# Patient Record
Sex: FEMALE | Race: WHITE
[De-identification: names, ages, dates, MRNs, and addresses within clinical notes are randomized per-mention and may not be internally consistent; named-entity substitution may affect disease eponyms.]

---

## 2015-10-01 VITALS — SYSTOLIC BLOOD PRESSURE: 116 MMHG | DIASTOLIC BLOOD PRESSURE: 68 MMHG

## 2017-01-26 VITALS
SYSTOLIC BLOOD PRESSURE: 121 MMHG | DIASTOLIC BLOOD PRESSURE: 56 MMHG | DIASTOLIC BLOOD PRESSURE: 56 MMHG | SYSTOLIC BLOOD PRESSURE: 121 MMHG

## 2017-03-21 ENCOUNTER — HOSPITAL ENCOUNTER (OUTPATIENT)
Dept: HOSPITAL 63 - DXRAD | Age: 77
Discharge: HOME | End: 2017-03-21
Attending: SPECIALIST
Payer: MEDICARE

## 2017-03-21 DIAGNOSIS — Z00.00: Primary | ICD-10-CM

## 2017-03-21 DIAGNOSIS — Z00.01: ICD-10-CM

## 2017-03-21 DIAGNOSIS — M85.88: ICD-10-CM

## 2017-03-21 DIAGNOSIS — Z13.820: ICD-10-CM

## 2017-03-21 PROCEDURE — 77080 DXA BONE DENSITY AXIAL: CPT

## 2017-03-21 NOTE — RAD
Indication screen for osteopenia.



The lumbar spine and right hip were evaluated. Note is made of a previous

examination 2/12/2008.



In the lumbar spine the average bone mineral density is approximately 1.5

g/cc. T score of 2.4 is normal. In the right hip the average bone mineral

density is approximately 0.98 g/cc with a T score of -0.3, also normal.



IMPRESSION: Normal bone mineral density in the lumbar spine and right hip

## 2017-06-02 ENCOUNTER — HOSPITAL ENCOUNTER (OUTPATIENT)
Dept: HOSPITAL 63 - RAD | Age: 77
Discharge: HOME | End: 2017-06-02
Attending: NURSE PRACTITIONER
Payer: MEDICARE

## 2017-06-02 DIAGNOSIS — R51: Primary | ICD-10-CM

## 2017-06-02 PROCEDURE — 70450 CT HEAD/BRAIN W/O DYE: CPT

## 2017-06-02 NOTE — RAD
Indication headache for 2 weeks.



Noncontrast images of the head were obtained. Note is made of a previous

examination 4/18/2014.



The calvarium appears unremarkable. There is a small polyp or retention cyst

in the left maxillary sinus. There is no subdural or epidural hematoma. The

ventricles and sulci are normal given the patient's age. There is no mass or

midline shift. No hemorrhage is seen. Acute finding is not apparent.



IMPRESSION: No acute finding seen in the head.















PQRS Compliance Statement:



One or more of the following individualized dose reduction techniques were

utilized for this examination:

1. Automated exposure control

2. Adjustment of the mA and/or kV according to patient size

3. Use of iterative reconstruction technique

## 2017-06-05 ENCOUNTER — HOSPITAL ENCOUNTER (INPATIENT)
Dept: HOSPITAL 63 - 1 SOUTH | Age: 77
LOS: 2 days | Discharge: HOME | DRG: 811 | End: 2017-06-07
Attending: INTERNAL MEDICINE | Admitting: INTERNAL MEDICINE
Payer: MEDICARE

## 2017-06-05 VITALS — SYSTOLIC BLOOD PRESSURE: 151 MMHG | DIASTOLIC BLOOD PRESSURE: 78 MMHG

## 2017-06-05 VITALS — SYSTOLIC BLOOD PRESSURE: 185 MMHG | DIASTOLIC BLOOD PRESSURE: 71 MMHG

## 2017-06-05 VITALS — BODY MASS INDEX: 30.48 KG/M2 | HEIGHT: 64 IN | WEIGHT: 178.56 LBS

## 2017-06-05 VITALS — DIASTOLIC BLOOD PRESSURE: 80 MMHG | SYSTOLIC BLOOD PRESSURE: 163 MMHG

## 2017-06-05 VITALS — SYSTOLIC BLOOD PRESSURE: 154 MMHG | DIASTOLIC BLOOD PRESSURE: 78 MMHG

## 2017-06-05 VITALS — SYSTOLIC BLOOD PRESSURE: 175 MMHG | DIASTOLIC BLOOD PRESSURE: 85 MMHG

## 2017-06-05 VITALS — SYSTOLIC BLOOD PRESSURE: 143 MMHG | DIASTOLIC BLOOD PRESSURE: 54 MMHG

## 2017-06-05 DIAGNOSIS — M19.90: ICD-10-CM

## 2017-06-05 DIAGNOSIS — Z82.3: ICD-10-CM

## 2017-06-05 DIAGNOSIS — I10: ICD-10-CM

## 2017-06-05 DIAGNOSIS — Z80.0: ICD-10-CM

## 2017-06-05 DIAGNOSIS — Z86.010: ICD-10-CM

## 2017-06-05 DIAGNOSIS — Z82.5: ICD-10-CM

## 2017-06-05 DIAGNOSIS — Z90.722: ICD-10-CM

## 2017-06-05 DIAGNOSIS — I48.91: ICD-10-CM

## 2017-06-05 DIAGNOSIS — N17.0: ICD-10-CM

## 2017-06-05 DIAGNOSIS — D50.9: Primary | ICD-10-CM

## 2017-06-05 DIAGNOSIS — Z85.528: ICD-10-CM

## 2017-06-05 DIAGNOSIS — Z91.040: ICD-10-CM

## 2017-06-05 DIAGNOSIS — Z93.6: ICD-10-CM

## 2017-06-05 DIAGNOSIS — Z88.1: ICD-10-CM

## 2017-06-05 DIAGNOSIS — Z88.0: ICD-10-CM

## 2017-06-05 DIAGNOSIS — J44.9: ICD-10-CM

## 2017-06-05 DIAGNOSIS — Z80.8: ICD-10-CM

## 2017-06-05 DIAGNOSIS — Z88.6: ICD-10-CM

## 2017-06-05 DIAGNOSIS — Z90.710: ICD-10-CM

## 2017-06-05 DIAGNOSIS — Z88.8: ICD-10-CM

## 2017-06-05 DIAGNOSIS — Z80.1: ICD-10-CM

## 2017-06-05 DIAGNOSIS — Z98.42: ICD-10-CM

## 2017-06-05 DIAGNOSIS — Z87.891: ICD-10-CM

## 2017-06-05 DIAGNOSIS — Z98.41: ICD-10-CM

## 2017-06-05 DIAGNOSIS — Z90.5: ICD-10-CM

## 2017-06-05 DIAGNOSIS — Z79.01: ICD-10-CM

## 2017-06-05 DIAGNOSIS — Z83.3: ICD-10-CM

## 2017-06-05 DIAGNOSIS — Z91.09: ICD-10-CM

## 2017-06-05 LAB
ALBUMIN SERPL-MCNC: 3.2 G/DL (ref 3.4–5)
ALBUMIN/GLOB SERPL: 0.8 {RATIO} (ref 1–1.7)
ALP SERPL-CCNC: 81 U/L (ref 46–116)
ALT SERPL-CCNC: 21 U/L (ref 14–59)
ANION GAP SERPL CALC-SCNC: 13 MMOL/L (ref 6–14)
ANISOCYTOSIS BLD QL SMEAR: SLIGHT
APTT PPP: YELLOW S
AST SERPL-CCNC: 18 U/L (ref 15–37)
BACTERIA #/AREA URNS HPF: 0 /HPF
BASOPHILS # BLD AUTO: 0.1 X10^3/UL (ref 0–0.2)
BASOPHILS NFR BLD: 1 % (ref 0–3)
BILIRUB SERPL-MCNC: 0.3 MG/DL (ref 0.2–1)
BILIRUB UR QL STRIP: (no result)
BUN/CREAT SERPL: 21 (ref 6–20)
CA-I SERPL ISE-MCNC: 27 MG/DL (ref 7–20)
CALCIUM SERPL-MCNC: 8.5 MG/DL (ref 8.5–10.1)
CHLORIDE SERPL-SCNC: 103 MMOL/L (ref 98–107)
CO2 SERPL-SCNC: 22 MMOL/L (ref 21–32)
CREAT SERPL-MCNC: 1.3 MG/DL (ref 0.6–1)
DACRYOCYTES BLD QL SMEAR: (no result)
EOSINOPHIL NFR BLD: 0.1 X10^3/UL (ref 0–0.7)
EOSINOPHIL NFR BLD: 2 % (ref 0–3)
ERYTHROCYTE [DISTWIDTH] IN BLOOD BY AUTOMATED COUNT: 19.8 % (ref 11.5–14.5)
FIBRINOGEN PPP-MCNC: CLEAR MG/DL
GFR SERPLBLD BASED ON 1.73 SQ M-ARVRAT: 40 ML/MIN
GLOBULIN SER-MCNC: 4.1 G/DL (ref 2.2–3.8)
GLUCOSE SERPL-MCNC: 92 MG/DL (ref 70–99)
GLUCOSE UR STRIP-MCNC: (no result) MG/DL
HCT VFR BLD CALC: 19.8 % (ref 36–47)
HGB BLD-MCNC: 5.8 G/DL (ref 12–15.5)
HYPOCHROMIA BLD QL SMEAR: (no result)
LDH SERPL L TO P-CCNC: 201 U/L (ref 81–234)
LYMPHOCYTES # BLD: 1.8 X10^3/UL (ref 1–4.8)
LYMPHOCYTES NFR BLD AUTO: 29 % (ref 24–48)
MCH RBC QN AUTO: 18 PG (ref 25–35)
MCHC RBC AUTO-ENTMCNC: 29 G/DL (ref 31–37)
MCV RBC AUTO: 60 FL (ref 79–100)
MICROCYTES BLD QL SMEAR: (no result)
MONO #: 0.6 X10^3/UL (ref 0–1.1)
MONOCYTES NFR BLD: 10 % (ref 0–9)
NEUT #: 3.6 X10^3UL (ref 1.8–7.7)
NEUTROPHILS NFR BLD AUTO: 58 % (ref 31–73)
NITRITE UR QL STRIP: (no result)
OVALOCYTES BLD QL SMEAR: (no result)
PLATELET # BLD AUTO: 314 X10^3/UL (ref 140–400)
PLATELET # BLD EST: ADEQUATE 10*3/UL
POLYCHROMASIA BLD QL SMEAR: PRESENT
POTASSIUM SERPL-SCNC: 3.8 MMOL/L (ref 3.5–5.1)
PROT SERPL-MCNC: 7.3 G/DL (ref 6.4–8.2)
RBC # BLD AUTO: 3.32 X10^6/UL (ref 3.5–5.4)
RBC #/AREA URNS HPF: (no result) /HPF (ref 0–2)
SODIUM SERPL-SCNC: 138 MMOL/L (ref 136–145)
SP GR UR STRIP: 1.01
SQUAMOUS #/AREA URNS LPF: (no result) /LPF
UROBILINOGEN UR-MCNC: 0.2 MG/DL
WBC # BLD AUTO: 6.2 X10^3/UL (ref 4–11)
WBC #/AREA URNS HPF: (no result) /HPF (ref 0–4)

## 2017-06-05 PROCEDURE — P9016 RBC LEUKOCYTES REDUCED: HCPCS

## 2017-06-05 PROCEDURE — 86920 COMPATIBILITY TEST SPIN: CPT

## 2017-06-05 PROCEDURE — 82607 VITAMIN B-12: CPT

## 2017-06-05 PROCEDURE — 86850 RBC ANTIBODY SCREEN: CPT

## 2017-06-05 PROCEDURE — 74176 CT ABD & PELVIS W/O CONTRAST: CPT

## 2017-06-05 PROCEDURE — 94760 N-INVAS EAR/PLS OXIMETRY 1: CPT

## 2017-06-05 PROCEDURE — 83540 ASSAY OF IRON: CPT

## 2017-06-05 PROCEDURE — 80048 BASIC METABOLIC PNL TOTAL CA: CPT

## 2017-06-05 PROCEDURE — 85008 BL SMEAR W/O DIFF WBC COUNT: CPT

## 2017-06-05 PROCEDURE — 85027 COMPLETE CBC AUTOMATED: CPT

## 2017-06-05 PROCEDURE — 83615 LACTATE (LD) (LDH) ENZYME: CPT

## 2017-06-05 PROCEDURE — 30233N1 TRANSFUSION OF NONAUTOLOGOUS RED BLOOD CELLS INTO PERIPHERAL VEIN, PERCUTANEOUS APPROACH: ICD-10-PCS | Performed by: INTERNAL MEDICINE

## 2017-06-05 PROCEDURE — 80053 COMPREHEN METABOLIC PANEL: CPT

## 2017-06-05 PROCEDURE — 36415 COLL VENOUS BLD VENIPUNCTURE: CPT

## 2017-06-05 PROCEDURE — 86901 BLOOD TYPING SEROLOGIC RH(D): CPT

## 2017-06-05 PROCEDURE — 82728 ASSAY OF FERRITIN: CPT

## 2017-06-05 PROCEDURE — 70450 CT HEAD/BRAIN W/O DYE: CPT

## 2017-06-05 PROCEDURE — 86900 BLOOD TYPING SEROLOGIC ABO: CPT

## 2017-06-05 PROCEDURE — 94640 AIRWAY INHALATION TREATMENT: CPT

## 2017-06-05 PROCEDURE — 83550 IRON BINDING TEST: CPT

## 2017-06-05 PROCEDURE — 81001 URINALYSIS AUTO W/SCOPE: CPT

## 2017-06-05 PROCEDURE — 82274 ASSAY TEST FOR BLOOD FECAL: CPT

## 2017-06-05 PROCEDURE — 82746 ASSAY OF FOLIC ACID SERUM: CPT

## 2017-06-05 RX ADMIN — IPRATROPIUM BROMIDE AND ALBUTEROL SULFATE SCH ML: .5; 3 SOLUTION RESPIRATORY (INHALATION) at 12:00

## 2017-06-05 RX ADMIN — APIXABAN SCH MG: 5 TABLET, FILM COATED ORAL at 21:55

## 2017-06-05 RX ADMIN — IPRATROPIUM BROMIDE AND ALBUTEROL SULFATE SCH ML: .5; 3 SOLUTION RESPIRATORY (INHALATION) at 22:22

## 2017-06-05 RX ADMIN — FLECAINIDE ACETATE SCH MG: 50 TABLET ORAL at 21:58

## 2017-06-05 RX ADMIN — IPRATROPIUM BROMIDE AND ALBUTEROL SULFATE SCH ML: .5; 3 SOLUTION RESPIRATORY (INHALATION) at 16:00

## 2017-06-05 RX ADMIN — IPRATROPIUM BROMIDE AND ALBUTEROL SULFATE SCH ML: .5; 3 SOLUTION RESPIRATORY (INHALATION) at 22:28

## 2017-06-06 VITALS — DIASTOLIC BLOOD PRESSURE: 82 MMHG | SYSTOLIC BLOOD PRESSURE: 132 MMHG

## 2017-06-06 VITALS — SYSTOLIC BLOOD PRESSURE: 133 MMHG | DIASTOLIC BLOOD PRESSURE: 92 MMHG

## 2017-06-06 VITALS — SYSTOLIC BLOOD PRESSURE: 149 MMHG | DIASTOLIC BLOOD PRESSURE: 81 MMHG

## 2017-06-06 VITALS — DIASTOLIC BLOOD PRESSURE: 68 MMHG | SYSTOLIC BLOOD PRESSURE: 138 MMHG

## 2017-06-06 VITALS — SYSTOLIC BLOOD PRESSURE: 157 MMHG | DIASTOLIC BLOOD PRESSURE: 76 MMHG

## 2017-06-06 VITALS — DIASTOLIC BLOOD PRESSURE: 80 MMHG | SYSTOLIC BLOOD PRESSURE: 145 MMHG

## 2017-06-06 VITALS — DIASTOLIC BLOOD PRESSURE: 72 MMHG | SYSTOLIC BLOOD PRESSURE: 129 MMHG

## 2017-06-06 VITALS — DIASTOLIC BLOOD PRESSURE: 77 MMHG | SYSTOLIC BLOOD PRESSURE: 158 MMHG

## 2017-06-06 VITALS — DIASTOLIC BLOOD PRESSURE: 69 MMHG | SYSTOLIC BLOOD PRESSURE: 144 MMHG

## 2017-06-06 VITALS — SYSTOLIC BLOOD PRESSURE: 132 MMHG | DIASTOLIC BLOOD PRESSURE: 82 MMHG

## 2017-06-06 VITALS — SYSTOLIC BLOOD PRESSURE: 135 MMHG | DIASTOLIC BLOOD PRESSURE: 65 MMHG

## 2017-06-06 VITALS — DIASTOLIC BLOOD PRESSURE: 75 MMHG | SYSTOLIC BLOOD PRESSURE: 155 MMHG

## 2017-06-06 LAB
ALBUMIN SERPL-MCNC: 2.9 G/DL (ref 3.4–5)
ALBUMIN/GLOB SERPL: 0.8 {RATIO} (ref 1–1.7)
ALP SERPL-CCNC: 71 U/L (ref 46–116)
ALT SERPL-CCNC: 19 U/L (ref 14–59)
ANION GAP SERPL CALC-SCNC: 11 MMOL/L (ref 6–14)
AST SERPL-CCNC: 18 U/L (ref 15–37)
BASOPHILS # BLD AUTO: 0.1 X10^3/UL (ref 0–0.2)
BASOPHILS NFR BLD: 1 % (ref 0–3)
BILIRUB SERPL-MCNC: 0.4 MG/DL (ref 0.2–1)
BUN/CREAT SERPL: 20 (ref 6–20)
CA-I SERPL ISE-MCNC: 20 MG/DL (ref 7–20)
CALCIUM SERPL-MCNC: 8.3 MG/DL (ref 8.5–10.1)
CHLORIDE SERPL-SCNC: 107 MMOL/L (ref 98–107)
CO2 SERPL-SCNC: 24 MMOL/L (ref 21–32)
CREAT SERPL-MCNC: 1 MG/DL (ref 0.6–1)
EOSINOPHIL NFR BLD: 0.1 X10^3/UL (ref 0–0.7)
EOSINOPHIL NFR BLD: 2 % (ref 0–3)
ERYTHROCYTE [DISTWIDTH] IN BLOOD BY AUTOMATED COUNT: 25.5 % (ref 11.5–14.5)
GFR SERPLBLD BASED ON 1.73 SQ M-ARVRAT: 54.2 ML/MIN
GLOBULIN SER-MCNC: 3.7 G/DL (ref 2.2–3.8)
GLUCOSE SERPL-MCNC: 96 MG/DL (ref 70–99)
HCT VFR BLD CALC: 24.8 % (ref 36–47)
HGB BLD-MCNC: 7.7 G/DL (ref 12–15.5)
LYMPHOCYTES # BLD: 1.7 X10^3/UL (ref 1–4.8)
LYMPHOCYTES NFR BLD AUTO: 29 % (ref 24–48)
MCH RBC QN AUTO: 20 PG (ref 25–35)
MCHC RBC AUTO-ENTMCNC: 31 G/DL (ref 31–37)
MCV RBC AUTO: 65 FL (ref 79–100)
MONO #: 0.7 X10^3/UL (ref 0–1.1)
MONOCYTES NFR BLD: 12 % (ref 0–9)
NEUT #: 3.3 X10^3UL (ref 1.8–7.7)
NEUTROPHILS NFR BLD AUTO: 56 % (ref 31–73)
PLATELET # BLD AUTO: 253 X10^3/UL (ref 140–400)
POTASSIUM SERPL-SCNC: 3.8 MMOL/L (ref 3.5–5.1)
PROT SERPL-MCNC: 6.6 G/DL (ref 6.4–8.2)
RBC # BLD AUTO: 3.8 X10^6/UL (ref 3.5–5.4)
SODIUM SERPL-SCNC: 142 MMOL/L (ref 136–145)
WBC # BLD AUTO: 5.8 X10^3/UL (ref 4–11)

## 2017-06-06 RX ADMIN — OYSTER SHELL CALCIUM WITH VITAMIN D SCH TAB: 500; 200 TABLET, FILM COATED ORAL at 17:21

## 2017-06-06 RX ADMIN — PANTOPRAZOLE SODIUM SCH MG: 40 TABLET, DELAYED RELEASE ORAL at 08:50

## 2017-06-06 RX ADMIN — FLUTICASONE PROPIONATE SCH SPRAY: 50 SPRAY, METERED NASAL at 08:50

## 2017-06-06 RX ADMIN — FLECAINIDE ACETATE SCH MG: 50 TABLET ORAL at 20:26

## 2017-06-06 RX ADMIN — Medication SCH EA: at 20:24

## 2017-06-06 RX ADMIN — APIXABAN SCH MG: 5 TABLET, FILM COATED ORAL at 19:54

## 2017-06-06 RX ADMIN — APIXABAN SCH MG: 5 TABLET, FILM COATED ORAL at 08:51

## 2017-06-06 RX ADMIN — FERROUS SULFATE TAB 325 MG (65 MG ELEMENTAL FE) SCH MG: 325 (65 FE) TAB at 20:25

## 2017-06-06 RX ADMIN — LOSARTAN POTASSIUM SCH MG: 50 TABLET, FILM COATED ORAL at 08:50

## 2017-06-06 RX ADMIN — FLECAINIDE ACETATE SCH MG: 50 TABLET ORAL at 08:49

## 2017-06-06 RX ADMIN — Medication SCH EA: at 08:41

## 2017-06-06 RX ADMIN — OYSTER SHELL CALCIUM WITH VITAMIN D SCH TAB: 500; 200 TABLET, FILM COATED ORAL at 08:51

## 2017-06-06 RX ADMIN — IPRATROPIUM BROMIDE AND ALBUTEROL SULFATE SCH ML: .5; 3 SOLUTION RESPIRATORY (INHALATION) at 07:20

## 2017-06-06 RX ADMIN — OXYCODONE HYDROCHLORIDE AND ACETAMINOPHEN SCH MG: 500 TABLET ORAL at 20:26

## 2017-06-06 RX ADMIN — CETIRIZINE HYDROCHLORIDE SCH MG: 10 TABLET, FILM COATED ORAL at 08:51

## 2017-06-06 RX ADMIN — IPRATROPIUM BROMIDE AND ALBUTEROL SULFATE SCH ML: .5; 3 SOLUTION RESPIRATORY (INHALATION) at 10:14

## 2017-06-06 RX ADMIN — IPRATROPIUM BROMIDE AND ALBUTEROL SULFATE SCH ML: .5; 3 SOLUTION RESPIRATORY (INHALATION) at 15:57

## 2017-06-06 NOTE — RAD
Indication anemia. Abdominal pain.



Axial images through the abdomen and pelvis were obtained. No IV or

gastrointestinal contrast was administered. Note is made of a previous

examination 5/26/2010.



There is some minimal volume loss at the lung bases left slightly greater than

right likely reflecting atelectasis or scar. A definite significant finding at

either lung base is not seen. There is a left-sided pelvic hernia containing

only fat which appears uncomplicated. This appears slightly larger than on the

previous exam but was present previously. There is a small cyst associated

with the right lower liver appearing similar to the previous exam. A

significant finding in the liver is not seen. Clips are noted in the

gallbladder fossa. The spleen appears unremarkable. No pancreatic abnormality

is seen. Solitary left kidney appears unremarkable. An acute finding in the

abdomen is not seen. In the pelvis no focal mass or inflammatory process is

seen. Mild diverticulosis is noted associated with the large bowel. There are

degenerative changes involving the lower lumbar spine with an associated

component of spinal stenosis



IMPRESSION: No acute or significant finding seen in the abdomen or pelvis

                          Mild volume loss at the lung bases likely reflecting

atelectasis or scar















PQRS Compliance Statement:



One or more of the following individualized dose reduction techniques were

utilized for this examination:

1. Automated exposure control

2. Adjustment of the mA and/or kV according to patient size

3. Use of iterative reconstruction technique

## 2017-06-07 VITALS
SYSTOLIC BLOOD PRESSURE: 158 MMHG | SYSTOLIC BLOOD PRESSURE: 158 MMHG | SYSTOLIC BLOOD PRESSURE: 158 MMHG | DIASTOLIC BLOOD PRESSURE: 77 MMHG | DIASTOLIC BLOOD PRESSURE: 77 MMHG | DIASTOLIC BLOOD PRESSURE: 77 MMHG | SYSTOLIC BLOOD PRESSURE: 158 MMHG | DIASTOLIC BLOOD PRESSURE: 77 MMHG | SYSTOLIC BLOOD PRESSURE: 158 MMHG | DIASTOLIC BLOOD PRESSURE: 77 MMHG | SYSTOLIC BLOOD PRESSURE: 158 MMHG | SYSTOLIC BLOOD PRESSURE: 158 MMHG | DIASTOLIC BLOOD PRESSURE: 77 MMHG | DIASTOLIC BLOOD PRESSURE: 77 MMHG | DIASTOLIC BLOOD PRESSURE: 77 MMHG | DIASTOLIC BLOOD PRESSURE: 77 MMHG | SYSTOLIC BLOOD PRESSURE: 158 MMHG | SYSTOLIC BLOOD PRESSURE: 158 MMHG

## 2017-06-07 VITALS — SYSTOLIC BLOOD PRESSURE: 126 MMHG | DIASTOLIC BLOOD PRESSURE: 55 MMHG

## 2017-06-07 VITALS — SYSTOLIC BLOOD PRESSURE: 147 MMHG | DIASTOLIC BLOOD PRESSURE: 72 MMHG

## 2017-06-07 LAB
ANION GAP SERPL CALC-SCNC: 10 MMOL/L (ref 6–14)
APTT PPP: YELLOW S
BACTERIA #/AREA URNS HPF: (no result) /HPF
BASOPHILS # BLD AUTO: 0.1 X10^3/UL (ref 0–0.2)
BASOPHILS NFR BLD: 1 % (ref 0–3)
BILIRUB UR QL STRIP: (no result)
CA-I SERPL ISE-MCNC: 15 MG/DL (ref 7–20)
CALCIUM SERPL-MCNC: 8.8 MG/DL (ref 8.5–10.1)
CHLORIDE SERPL-SCNC: 107 MMOL/L (ref 98–107)
CO2 SERPL-SCNC: 24 MMOL/L (ref 21–32)
CREAT SERPL-MCNC: 1 MG/DL (ref 0.6–1)
EOSINOPHIL NFR BLD: 0.2 X10^3/UL (ref 0–0.7)
EOSINOPHIL NFR BLD: 3 % (ref 0–3)
ERYTHROCYTE [DISTWIDTH] IN BLOOD BY AUTOMATED COUNT: 26 % (ref 11.5–14.5)
FECAL OB PT: NEGATIVE
FIBRINOGEN PPP-MCNC: (no result) MG/DL
GFR SERPLBLD BASED ON 1.73 SQ M-ARVRAT: 54.2 ML/MIN
GLUCOSE SERPL-MCNC: 96 MG/DL (ref 70–99)
GLUCOSE UR STRIP-MCNC: (no result) MG/DL
HCT VFR BLD CALC: 27.2 % (ref 36–47)
HGB BLD-MCNC: 8.5 G/DL (ref 12–15.5)
LYMPHOCYTES # BLD: 1.6 X10^3/UL (ref 1–4.8)
LYMPHOCYTES NFR BLD AUTO: 26 % (ref 24–48)
MCH RBC QN AUTO: 20 PG (ref 25–35)
MCHC RBC AUTO-ENTMCNC: 31 G/DL (ref 31–37)
MCV RBC AUTO: 65 FL (ref 79–100)
MONO #: 0.8 X10^3/UL (ref 0–1.1)
MONOCYTES NFR BLD: 14 % (ref 0–9)
NEUT #: 3.4 X10^3UL (ref 1.8–7.7)
NEUTROPHILS NFR BLD AUTO: 56 % (ref 31–73)
NITRITE UR QL STRIP: (no result)
PLATELET # BLD AUTO: 282 X10^3/UL (ref 140–400)
POTASSIUM SERPL-SCNC: 4 MMOL/L (ref 3.5–5.1)
RBC # BLD AUTO: 4.21 X10^6/UL (ref 3.5–5.4)
RBC #/AREA URNS HPF: 0 /HPF (ref 0–2)
SODIUM SERPL-SCNC: 141 MMOL/L (ref 136–145)
SP GR UR STRIP: 1.01
SQUAMOUS #/AREA URNS LPF: (no result) /LPF
UROBILINOGEN UR-MCNC: 0.2 MG/DL
WBC # BLD AUTO: 6.1 X10^3/UL (ref 4–11)
WBC #/AREA URNS HPF: (no result) /HPF (ref 0–4)

## 2017-06-07 RX ADMIN — CETIRIZINE HYDROCHLORIDE SCH MG: 10 TABLET, FILM COATED ORAL at 09:13

## 2017-06-07 RX ADMIN — Medication SCH EA: at 09:22

## 2017-06-07 RX ADMIN — Medication SCH EA: at 09:19

## 2017-06-07 RX ADMIN — APIXABAN SCH MG: 5 TABLET, FILM COATED ORAL at 09:20

## 2017-06-07 RX ADMIN — FLECAINIDE ACETATE SCH MG: 50 TABLET ORAL at 09:11

## 2017-06-07 RX ADMIN — LOSARTAN POTASSIUM SCH MG: 50 TABLET, FILM COATED ORAL at 09:09

## 2017-06-07 RX ADMIN — OXYCODONE HYDROCHLORIDE AND ACETAMINOPHEN SCH MG: 500 TABLET ORAL at 09:12

## 2017-06-07 RX ADMIN — OYSTER SHELL CALCIUM WITH VITAMIN D SCH TAB: 500; 200 TABLET, FILM COATED ORAL at 09:08

## 2017-06-07 RX ADMIN — PANTOPRAZOLE SODIUM SCH MG: 40 TABLET, DELAYED RELEASE ORAL at 09:11

## 2017-06-07 RX ADMIN — FERROUS SULFATE TAB 325 MG (65 MG ELEMENTAL FE) SCH MG: 325 (65 FE) TAB at 09:10

## 2017-06-07 RX ADMIN — APIXABAN SCH MG: 5 TABLET, FILM COATED ORAL at 09:09

## 2017-06-07 RX ADMIN — FLUTICASONE PROPIONATE SCH SPRAY: 50 SPRAY, METERED NASAL at 09:19

## 2017-06-07 NOTE — DS
DATE OF DISCHARGE:  06/07/2017



HISTORY:  The patient is a 76-year-old  female patient who was admitted

as a direct admission from her primary care physician with complaint of

worsening fatigue and exhaustion since January of this year.  She complained of

shortness of breath on exertion.  In her primary care physician's office, she

was found to be extremely anemic, severe microcytic hypochromic anemia with

hemoglobin of only 5.6 and repeating here in our hospital, her hemoglobin was

found to be 5.8, hematocrit 19.8, MCV was 60; however, her white cell count and

platelet were normal.  She did receive 2 units of packed RBCs and further

evaluation showed that she has severe iron deficiency anemia.  Her serum iron

was only 11, total iron binding capacity was 571 and her percent saturation was

only 2%, and serum ferritin was only 4 ng/mL.  She did receive 200 mg of Venofer

IV and we started her on ferrous sulfate and ascorbic acid and today's labs

actually showed hemoglobin of 8.5, hematocrit 27.2.  Her MCV has risen from

60-65.  White cell count was 6100 and platelet count 282,000.  The patient has

remained stable.  Her hemoglobin and hematocrit remained stable and she

apparently had had colonoscopy done on 06/01/2017, by Dr. Jai Galvin. 

I contacted his office to arrange for her to have esophagogastroduodenoscopy as

the most likely she is losing blood and obviously, she is on edoxaban for her

atrial fibrillation and obviously that will worsen the tendency to bleed.  We

did send stool for occult blood, the results of which are still pending.  I also

checked her vitamin B12 and folic acid, the result is still pending at the time

of this dictation, but her lab works are all consistent with severe iron

deficiency.



PHYSICAL EXAMINATION:

GENERAL:  When I examined her this morning, she looked well and was clearly in

no apparent respiratory distress.  She was pale, no jaundice, cyanosis, or

thyromegaly.  No jugular venous distention.  No limb edema.

VITAL SIGNS:  Her heart rate was 86, blood pressure was 147/72, temperature was

98.1, respiratory rate 20, and oxygen saturation was 94%.

HEAD, EYES, EARS, NOSE, AND THROAT:  Showed normocephalic, atraumatic.

NECK:  Supple.

HEART:  Showed normal first and second heart sounds with no gallop, rub or

murmur.

CHEST:  Clear to auscultation.  No crepitation or rhonchi.

ABDOMEN:  Distended, soft, and nontender.  No guarding or rigidity.  No

organomegaly.  Hernial orifices intact.  Bowel sounds normal.

NEUROLOGIC:  She is awake, alert, responding appropriately.  Her cranial nerves

intact.

EXTREMITIES:  She moves her extremities without difficulty.  She ambulates

without assistance or assistive devices.



LABORATORY DATA:  Her intake was 2000, output was 2900.  Her lab work this

morning showed a white cell count 6100, hemoglobin 8.5, hematocrit 27.2, MCV was

65, and platelet count 282,000.  Her serum sodium was 141, potassium 4, chloride

107, bicarbonate 24, anion gap of 10, BUN 15, creatinine 1, estimated GFR was 54

mL per minute.  Her glucose was 96, calcium was 8.8.  Total bilirubin, AST, ALT,

alkaline phosphatase were normal.  Total protein was 6.6, albumin 2.9.  Her

serum iron was 11, total iron binding capacity was 571, percent saturation was

2%, and serum ferritin was 4 ng/mL.



DISCHARGE MEDICATIONS:  The patient will be discharged home to continue on the

following medications:  Ascorbic acid 500 mg twice a day, ferrous sulfate 325 mg

twice a day with meals, Tylenol 500 mg every 6 hours, albuterol sulfate via

nebulizer twice a day, cilastatin, Optivar both eyes b.i.d., calcium with

vitamin D one tablet twice a day, cetirizine 10 mg p.o. daily, Cardizem 

mg once a day, docusate sodium 2 capsules daily, edoxaban tosylate 60 mg at

bedtime, esomeprazole, Nexium 40 mg once a day, estrogen conjugated 0.2 mg p.o.

at bedtime, flecainide acetate 50 mg twice a day, fluticasone proprionate 1

spray to each nostril twice a day, Advair Diskus 500/50 two puffs twice a day,

Mucinex 600 mg twice a day, losartan potassium, Cozaar 100 mg once a day,

Metamucil 1 packet daily, tiotropium bromide, Spiriva HandiHaler 1 inhalation

once a day, and Accolate 20 mg twice a day.



FINAL DISCHARGE DIAGNOSES:  Severe iron deficiency anemia, the source of

bleeding was not clear.  She denied any hematemesis, melena, hematochezia,

hematuria or hemoptysis.  She underwent total abdominal hysterectomy and

bilateral salpingo-oophorectomy in 1972.  She is not on any nonsteroidal

anti-inflammatory medication; however, she is on edoxaban and she has strong

history of cancer in her family.  The patient will need to have upper GI

endoscopy to find out the source of bleeding.  She has multiple other medical

problems including chronic obstructive pulmonary disease/bronchial asthma,

hypertension, renal cell carcinoma, status post right nephrectomy in 2009.  She

has multiple colon polyps.  She has six polyps removed 3 years ago and she

underwent colonoscopy on 06/01/2017, and she had one polyp that was removed. 

The other polyp showed atypical cells and plan is for it to be removed.  She is

known to have atrial fibrillation, on oral anticoagulation in the form edoxaban.

 She has nodular osteoarthritis affecting mainly the small joints, the distal

interphalangeal joints of both hands.





______________________________

CADEN GARCIA MD



DR:  FANNY/vijaya  JOB#:  [id number] / [id number]

DD:  06/07/2017 11:09  DT:  06/07/2017 23:03

## 2017-06-07 NOTE — HP
ADMIT DATE:  2017



HISTORY OF PRESENT ILLNESS:  The patient is a 76-year-old  female

patient who was admitted directly from her primary care physician where she was

seen complaining of fatigue and exhaustion since January of this year.  She also

complained of shortness of breath on minimal exertion.  In her primary care

physician office, she was found to be extremely anemic with severe microcytic

hypochromic anemia with hemoglobin of only 5.6 g/dL, and on repeating the lab

work here, her hemoglobin was found to be 5.8, hematocrit 19.8 and MCV of 90;

however, her white cell count and platelets were normal.  The patient was

admitted to be investigated further.  We will order 2 units of packed RBCs and

ordered her serum iron, total iron binding capacity, serum ferritin, vitamin

B12, folic acid as well as stool for occult blood.  On questioning her, she

denied using any nonsteroidals.  She takes only acetaminophen.  She denied any

hematemesis, hemoptysis, melena or hematochezia.  Denied any hematuria.  She has

had hysterectomy in .



PAST MEDICAL HISTORY:  Significant for chronic obstructive pulmonary

disease/bronchial asthma, hypertension, has renal cell carcinoma and status post

right nephrectomy in .  She has multiple colon polyps.  She has 6 polyps

removed 2 years ago and one removed this year and one needs to be treated

surgically as she has atypical cells according to her.  She is known to have

atrial fibrillation, on oral anticoagulation in the form of edoxaban.  She also

has nodular osteoarthritis affecting mainly the small joints of her distal

interphalangeal joints of both hands.



PAST SURGICAL HISTORY:  Significant for right nephrectomy.  Colonoscopy several

times, bronchoscopy x 3, bilateral cataract extractions, tonsillectomy,

cholecystectomy, total abdominal hysterectomy and bilateral

salpingo-oophorectomy, hemorrhoidectomy, cystocele and rectocele surgical repair

1 year ago.  She had a colonoscopy done on 2017, which was unremarkable

except for 2 polyps, one was easily removed.  The other needs to be probably

removed surgically.



ALLERGIES:  SHE IS ALLERGIC TO PENICILLIN, SULFA DRUGS, AMOXICILLIN, ASPIRIN,

BALSAMIC VINEGAR, LATEX, AND LYE.



MEDICATIONS:  She is currently on following medications:  She is on

acetaminophen 325 mg once a day, albuterol sulfate by nebulizer twice a day,

azelastine 6 mL twice a day.  She is on Caltrate with vitamin D plus, calcium

twice a day, cetirizine 10 mg once a day, diltiazem hydrochloride 300 mg once a

day, Colace 100 mg twice a day and edoxaban 60 mg at bedtime, Nexium 40 mg once

a day, conjugated estrogen 0.3 mg at bedtime, Flecainide 50 mg twice a day,

fluticasone propionate (Flonase) 1 spray to each nostril twice a day.  She is on

Advair Diskus 500/50 one to 2 puffs twice a day, Mucinex 600 mg twice a day,

losartan potassium 100 mg once a day, Metamucil fiber 1 packet daily.  She is on

Spiriva HandiHaler one inhalation once a day, Accolate 20 mg twice a day.



FAMILY HISTORY:  She has 2 brothers alive, one older and has COPD and

cerebrovascular accident, one older brother also has lung cancer, prostate

cancer, diabetes and skin cancer.  Her younger sister is still alive and she was

diagnosed with skin cancer.  One brother  at age of 61 with stomach cancer. 

One brother  at the age of 62 due to brain aneurysm and he is known to have

colon cancer.  His mother  at age of 80 because of COPD and father  at

age of 70 because of complication of diabetes mellitus.



SOCIAL HISTORY:  She is , has 2 children, a son and daughter.  She quit

smoking in .  She drinks alcohol occasionally.  She used to work for ____.



REVIEW OF SYSTEMS:  The patient denied any blurring of vision.  She has

bilateral cataract extractions, but denied any glaucoma or macular degeneration.

 Denied any earache, tinnitus or sensorineural deafness.  Denied any nosebleeds,

stuffy nose or postnasal drip.  Denied any sore throat, sore tongue, toothache,

hoarseness of voice or difficulty swallowing.  Denied any weight loss.  Denied

any nausea, vomiting, diarrhea or constipation.  Denied any hematemesis, melena

or hematochezia.  Denied any dysuria, frequency or hematuria.  Denied any chest

pain.  Did complain of shortness of breath.



PHYSICAL EXAMINATION:

GENERAL:  On examining her, she was extremely pale, but not jaundiced, cyanosis,

or thyromegaly.  No jugular venous distension.  No lower limb edema.

VITAL SIGNS:  Her heart rate 100, blood pressure was 154/78, temperature was

98.5, respiratory rate was 16, and oxygen saturation was 95%.

HEAD, EYES, EARS, NOSE AND THROAT:  Showed normocephalic, atraumatic.

NECK:  Supple, with no lymphadenopathy, no thyromegaly.  No jugular venous

distention.  No audible bruit.

HEART:  Showed normal first and second heart sounds with no gallop, rub or

murmur.

CHEST:  Clear to auscultation.  No crepitation or rhonchi.

ABDOMEN:  Distended, soft, nontender.  No guarding or rigidity.  No

organomegaly.  Hernial orifices intact.  Bowel sounds normal.

NEUROLOGIC:  She was awake, alert, responding appropriately.  Cranial nerves

intact.

EXTREMITIES:  She moves extremities without difficulty.  She ambulates without

assistance or assistive devices.



LABORATORY DATA:  On admission showed a white cell count of 6200, hemoglobin

5.8, hematocrit 19.8, MCV 60 and platelet count of 314,000 with normal manual

differential.  Her chemistry showed a serum sodium 138, potassium 3.8, chloride

103, bicarbonate 22, anion gap of 13, BUN 27, creatinine 1.3, estimated GFR was

40, glucose was 92, calcium was 8.5.  Total bilirubin, AST, ALT, alkaline

phosphatase were normal.  Her lactate dehydrogenase was 201.  Total protein was

7.3, albumin 3.2.  Urinalysis was unremarkable.



SUMMARY:  This is a 76-year-old  male patient who was admitted through

her primary care physician's office with increasing fatigue, exhaustion, and

shortness of breath.  This has been ongoing gradually and has worsened since

January of this year.  Her lab work showed severe microcytic hypochromic anemia,

most likely due to iron-deficiency anemia.  She denied using any nonsteroidal

anti-inflammatory medication.  Denied any hematemesis, melena or hematochezia. 

Denied any hemoptysis or hematuria.  She has not lived in any underdeveloped

countries; however, she is on anticoagulation.  She has had a colonoscopy done

only about 5 days ago, which was unremarkable leaving the upper GI potential

source for bleeding that obviously aggravated by the fact that she is on

edoxaban.  She has also some family history of cancer of the lungs, stomach,

skin.



PLAN:  The patient has already received 2 units of packed RBCs, we sent lab work

and is still pending.  I will start her on Venofer and iron.  I recommended that

she stays overnight and repeat her lab work tomorrow.  We will check her stool

for occult blood, and we will contact her gastroenterologist to arrange for her

upper GI endoscopy to explore the possibility of peptic ulcer disease that might

be bleeding slowly.





______________________________

CADEN GARCIA MD



DR:  FANNY/vijaya  JOB#:  [id number] / [id number]

DD:  2017 15:02  DT:  2017 21:34

## 2017-06-07 NOTE — PN
DATE:  06/06/2017



SUBJECTIVE:  The patient is resting, slightly propped up in bed, in no apparent

distress.  She basically received 2 units of packed RBCs and her hemoglobin and

hematocrit have been 7.7 and 24.8.  None of the lab work that we ordered is

available yet and we have not got any stool for occult blood.  She is not taking

any nonsteroidal anti-inflammatory medications.  She did not volunteer any blood

loss that is always obvious, in particular denied any hemoptysis, hematemesis,

melena, hematochezia.  Denied any hematuria.  She has hysterectomy done long

time ago and she has had colonoscopy done about 5 days ago, which showed that

the patient had 2 polyps, one of them was removed and one showed it has atypical

cyst that needs to be surgically removed.  She has not resided in any area

infested with hookworms or tapeworm and has been on a cruise twice in areas that

is far away from tropical areas and they have not really spent any time inland.



PHYSICAL EXAMINATION:

GENERAL:  When I examined her today, she looked well and was clearly in no

apparent respiratory distress, pale, not jaundiced, cyanosis, or thyromegaly. 

No jugular venous distention.  No _____.

VITAL SIGNS:  Her heart rate was 92, blood pressure 157/76, temperature was

98.1, respiratory rate 24, oxygen saturation was 96%.

HEAD, EYES, EARS, NOSE AND THROAT:  Normocephalic, atraumatic.

NECK:  Supple, with no lymphadenopathy, no thyromegaly, no jugular venous

distention, no _____.

CARDIOVASCULAR:  Her heart showed normal first and second heart sounds.  No

gallop, rub or murmur.

CHEST:  Shows central trachea, equal bilateral expansion, air entry, vesicular

breath sounds.  No crepitation or rhonchi.

ABDOMEN:  Distended, soft, nontender.  No guarding or rigidity.  No

organomegaly.  All hernial orifices intact.  Bowel sounds normal.

NEUROLOGIC:  She was awake, alert, responding appropriately.  Cranial nerves

intact.  She moves her extremities without difficulty.



LABORATORY DATA:  Her white cell count was 5800, hemoglobin 7.7, hematocrit

24.6, MCV 65, and platelet count of 253,000.  Her chemistry showed a serum

sodium of 142, potassium 3.8, chloride 107, bicarbonate 24, anion gap of 11, BUN

20, creatinine 1, estimated GFR was 54 mL per minute.  Her glucose was 96,

calcium was 8.3.  Total bilirubin, AST, ALT, alkaline phosphatase were normal. 

Total protein was 6.6, albumin was 2.9.  My plan is to start her on Venofer 200

mg IV today.  We will also start ferrous sulfate 325 mg twice a day as well as

ascorbic acid 500 mg twice a day.  I have also arranged given that she has

progressive abdominal distention, arranged for her to have abdomen and pelvic CT

scan without contrast.  We will repeat all her labs tomorrow and if her H and H

are stabilized and all the lab works were ordered became available, we will talk

with her gastroenterologist regarding upper GI endoscopy.





______________________________

CADEN GARCIA MD



DR:  FANNY/vijaya  JOB#:  [id number] / [id number]

DD:  06/06/2017 15:14  DT:  06/07/2017 06:32

## 2017-08-22 ENCOUNTER — HOSPITAL ENCOUNTER (OUTPATIENT)
Dept: HOSPITAL 63 - US | Age: 77
Discharge: HOME | End: 2017-08-22
Attending: UROLOGY
Payer: MEDICARE

## 2017-08-22 DIAGNOSIS — Z85.528: ICD-10-CM

## 2017-08-22 DIAGNOSIS — K76.89: ICD-10-CM

## 2017-08-22 DIAGNOSIS — Z90.5: ICD-10-CM

## 2017-08-22 DIAGNOSIS — Q60.2: Primary | ICD-10-CM

## 2017-08-22 LAB
ALBUMIN SERPL-MCNC: 3.8 G/DL (ref 3.4–5)
ALBUMIN/GLOB SERPL: 1 {RATIO} (ref 1–1.7)
ALP SERPL-CCNC: 87 U/L (ref 46–116)
ALT SERPL-CCNC: 31 U/L (ref 14–59)
ANION GAP SERPL CALC-SCNC: 8 MMOL/L (ref 6–14)
AST SERPL-CCNC: 22 U/L (ref 15–37)
BILIRUB SERPL-MCNC: 0.3 MG/DL (ref 0.2–1)
BUN/CREAT SERPL: 21 (ref 6–20)
CA-I SERPL ISE-MCNC: 25 MG/DL (ref 7–20)
CALCIUM SERPL-MCNC: 9.4 MG/DL (ref 8.5–10.1)
CHLORIDE SERPL-SCNC: 101 MMOL/L (ref 98–107)
CO2 SERPL-SCNC: 29 MMOL/L (ref 21–32)
CREAT SERPL-MCNC: 1.2 MG/DL (ref 0.6–1)
GFR SERPLBLD BASED ON 1.73 SQ M-ARVRAT: 43.8 ML/MIN
GLOBULIN SER-MCNC: 3.8 G/DL (ref 2.2–3.8)
GLUCOSE SERPL-MCNC: 100 MG/DL (ref 70–99)
POTASSIUM SERPL-SCNC: 4 MMOL/L (ref 3.5–5.1)
PROT SERPL-MCNC: 7.6 G/DL (ref 6.4–8.2)
SODIUM SERPL-SCNC: 138 MMOL/L (ref 136–145)

## 2017-08-22 PROCEDURE — 76770 US EXAM ABDO BACK WALL COMP: CPT

## 2017-08-22 PROCEDURE — 36415 COLL VENOUS BLD VENIPUNCTURE: CPT

## 2017-08-22 PROCEDURE — 80053 COMPREHEN METABOLIC PANEL: CPT

## 2017-08-22 NOTE — RAD
Exam performed: Renal sonogram.



History: History of renal cell carcinoma with right nephrectomy.



Date of service: 08/22/17. Comparison: CT abdomen pelvis without contrast from

06/06/17.



Technique: Real-time grayscale imaging of the kidney is performed and images

are obtained.



Findings:



There are right kidney is surgically absent and is not seen. The left kidney

measures 12.8 x 6.2 x 5.4 cm. There is no hydronephrosis or nephrolithiasis.

No perinephric fluid is seen.



Incidental hepatic cyst noted.



Impression:



Normal left kidney, status post right nephrectomy.

## 2017-10-09 ENCOUNTER — HOSPITAL ENCOUNTER (OUTPATIENT)
Dept: HOSPITAL 63 - MAMMO | Age: 77
Discharge: HOME | End: 2017-10-09
Attending: SPECIALIST
Payer: MEDICARE

## 2017-10-09 DIAGNOSIS — N60.02: ICD-10-CM

## 2017-10-09 DIAGNOSIS — N63.21: Primary | ICD-10-CM

## 2017-10-09 PROCEDURE — 76641 ULTRASOUND BREAST COMPLETE: CPT

## 2017-10-09 NOTE — RAD
DATE: 10/09/17



EXAM: DIGITAL DIAGNOSTIC LT, BREAST LEFT



HISTORY: Patient complains of soreness around 3:00 position in the left breast

for about a week,  felt a lump in that region in the past week. Patient

recently started Premarin to 3 weeks ago



COMPARISON: Bilateral screening mammogram from 12/05/16



This study was interpreted with the benefit of Computerized Aided Detection

(CAD).



Diagnostic left mammogram:



Diagnostic 2-D and 3-D mammogram of the left breast are obtained. No

suspicious clustered microcalcifications, architectural distortion or masses

are seen. Occasional stable benign calcifications are noted.



Ultrasound to follow.



Left breast ultrasound discussion:



Targeted sonographic evaluation of the left breast is performed at 3:00

position. There is a tiny 5.1 x 3.5 x 3.2 mm cyst at 3:00 position, 5 cm from

the nipple. No additional abnormalities identified.



IMPRESSION: 



Simple cyst at 3:00 position is noted. No additional abnormality seen.

Six-month follow-up left breast mammogram may be of obtained to ensure

interval stability



BI-RADS CATEGORY: 3 PROBABLE BENIGN-SHORT TERM F/U



RECOMMENDED FOLLOW-UP: 6M 6 MONTH FOLLOW-UP



PQRS compliance statement: Patient information was entered into a reminder

system with a target due date for the next mammogram.



Mammography is a sensitive method for finding small breast cancers, but it

does not detect them all and is not a substitute for careful clinical

examination.  A negative mammogram does not negate a clinically suspicious

finding and should not result in delay in biopsying a clinically suspicious

abnormality.



"Our facility is accredited by the [employer] Mammography

Program."

## 2017-10-19 ENCOUNTER — HOSPITAL ENCOUNTER (OUTPATIENT)
Dept: HOSPITAL 63 - CT | Age: 77
Discharge: HOME | End: 2017-10-19
Attending: NURSE PRACTITIONER
Payer: MEDICARE

## 2017-10-19 DIAGNOSIS — J98.11: ICD-10-CM

## 2017-10-19 DIAGNOSIS — Z90.710: ICD-10-CM

## 2017-10-19 DIAGNOSIS — K57.30: ICD-10-CM

## 2017-10-19 DIAGNOSIS — K40.90: Primary | ICD-10-CM

## 2017-10-19 DIAGNOSIS — Z90.49: ICD-10-CM

## 2017-10-19 DIAGNOSIS — Z90.5: ICD-10-CM

## 2017-10-19 DIAGNOSIS — K76.89: ICD-10-CM

## 2017-10-19 LAB
ALBUMIN SERPL-MCNC: 3.9 G/DL (ref 3.4–5)
ALBUMIN/GLOB SERPL: 0.9 {RATIO} (ref 1–1.7)
ALP SERPL-CCNC: 109 U/L (ref 46–116)
ALT SERPL-CCNC: 29 U/L (ref 14–59)
AMYLASE SERPL-CCNC: 80 U/L (ref 25–115)
ANION GAP SERPL CALC-SCNC: 11 MMOL/L (ref 6–14)
AST SERPL-CCNC: 24 U/L (ref 15–37)
BASOPHILS # BLD AUTO: 0 X10^3/UL (ref 0–0.2)
BASOPHILS NFR BLD: 0 % (ref 0–3)
BILIRUB SERPL-MCNC: 0.4 MG/DL (ref 0.2–1)
BUN/CREAT SERPL: 25 (ref 6–20)
CA-I SERPL ISE-MCNC: 27 MG/DL (ref 7–20)
CALCIUM SERPL-MCNC: 9.7 MG/DL (ref 8.5–10.1)
CHLORIDE SERPL-SCNC: 101 MMOL/L (ref 98–107)
CO2 SERPL-SCNC: 26 MMOL/L (ref 21–32)
CREAT SERPL-MCNC: 1.1 MG/DL (ref 0.6–1)
EOSINOPHIL NFR BLD: 0.1 X10^3/UL (ref 0–0.7)
EOSINOPHIL NFR BLD: 1 % (ref 0–3)
ERYTHROCYTE [DISTWIDTH] IN BLOOD BY AUTOMATED COUNT: 14.8 % (ref 11.5–14.5)
GFR SERPLBLD BASED ON 1.73 SQ M-ARVRAT: 48.4 ML/MIN
GLOBULIN SER-MCNC: 4.4 G/DL (ref 2.2–3.8)
GLUCOSE SERPL-MCNC: 123 MG/DL (ref 70–99)
HCT VFR BLD CALC: 43.7 % (ref 36–47)
HGB BLD-MCNC: 14.9 G/DL (ref 12–15.5)
LIPASE: 131 U/L (ref 73–393)
LYMPHOCYTES # BLD: 1.7 X10^3/UL (ref 1–4.8)
LYMPHOCYTES NFR BLD AUTO: 12 % (ref 24–48)
MCH RBC QN AUTO: 30 PG (ref 25–35)
MCHC RBC AUTO-ENTMCNC: 34 G/DL (ref 31–37)
MCV RBC AUTO: 88 FL (ref 79–100)
MONO #: 1 X10^3/UL (ref 0–1.1)
MONOCYTES NFR BLD: 7 % (ref 0–9)
NEUT #: 11.3 X10^3UL (ref 1.8–7.7)
NEUTROPHILS NFR BLD AUTO: 80 % (ref 31–73)
PLATELET # BLD AUTO: 367 X10^3/UL (ref 140–400)
POTASSIUM SERPL-SCNC: 4.3 MMOL/L (ref 3.5–5.1)
PROT SERPL-MCNC: 8.3 G/DL (ref 6.4–8.2)
RBC # BLD AUTO: 4.99 X10^6/UL (ref 3.5–5.4)
SODIUM SERPL-SCNC: 138 MMOL/L (ref 136–145)
WBC # BLD AUTO: 14.1 X10^3/UL (ref 4–11)

## 2017-10-19 PROCEDURE — 85025 COMPLETE CBC W/AUTO DIFF WBC: CPT

## 2017-10-19 PROCEDURE — 74176 CT ABD & PELVIS W/O CONTRAST: CPT

## 2017-10-19 PROCEDURE — 83690 ASSAY OF LIPASE: CPT

## 2017-10-19 PROCEDURE — 82150 ASSAY OF AMYLASE: CPT

## 2017-10-19 PROCEDURE — 36415 COLL VENOUS BLD VENIPUNCTURE: CPT

## 2017-10-19 PROCEDURE — 80053 COMPREHEN METABOLIC PANEL: CPT

## 2017-10-19 NOTE — RAD
EXAM: CT abdomen/pelvis without contrast.



HISTORY: Abdominal pain and nausea.



TECHNIQUE: Computed tomography of the abdomen and pelvis was performed without

intravenous contrast.



COMPARISON: 6/6/2017.



FINDINGS: Lung windows through the visualized portions of the bases reveal

debris within the lower lumbar by bilaterally with associated atelectasis.

There is associated bronchial wall thickening. Bone windows reveal no

suspicious lesions.



A cyst in hepatic segment 6 is stable since the prior study and likely benign.

The gallbladder is surgically absent. The spleen, adrenal glands and pancreas

are unremarkable. The right kidney is surgically absent. The left kidney is

unremarkable without contrast. There are no pathologically enlarged lymph

nodes. Small lymph nodes scattered throughout the retroperitoneum are stable.



The uterus is surgically absent. There are changes of pelvic floor relaxation.

A small to moderate left inguinal hernia contains only fat.



Sigmoid diverticulosis is moderate. Stool throughout the colon is consistent

with constipation. The majority of the small bowel is within the right

abdomen. However, the ligament of Treitz appears to be on the left. The small

bowel is diffusely mildly to moderately dilated, but there is no clear

transition point. Changes of ileocolonic anastomosis are noted, and the

potential obstruction may be at the anastomosis.



IMPRESSION: 

1. Moderate distention of the small bowel suggesting partial small bowel

obstruction. The suggested point of obstruction is at the ileocolonic

anastomosis. Correlate with symptoms.

2. Debris within the lower lung bronchi bilaterally. Correlate for aspiration

versus uncleared secretions.

3. Correlate for a component of constipation.

4. Fat-containing small to moderate left inguinal hernia.

5. Changes of pelvic floor relaxation.





*One or more of the following individualized dose reduction techniques were

utilized for this examination:  

1. Automated exposure control.  

2. Adjustment of the mA and/or kV according to patient size.  

3. Use of iterative reconstruction technique.

## 2018-03-23 ENCOUNTER — HOSPITAL ENCOUNTER (OUTPATIENT)
Dept: HOSPITAL 63 - MAMMO | Age: 78
Discharge: HOME | End: 2018-03-23
Attending: SPECIALIST
Payer: MEDICARE

## 2018-03-23 DIAGNOSIS — N63.20: Primary | ICD-10-CM

## 2018-03-23 PROCEDURE — 77065 DX MAMMO INCL CAD UNI: CPT

## 2018-03-23 NOTE — RAD
DATE: 3/23/2018



EXAM: MAMMO XI DIAG LT



HISTORY: Follow-up breast nodule



COMPARISON: 10/9/2017, 12/5/2016



This study was interpreted with the benefit of Computerized Aided Detection

(CAD).



The breast parenchyma shows scattered fibroglandular densities. Breast

parenchyma level B.



FINDINGS: 2-D and 3-D tomosynthesis imaging was performed in CC and MLO

projections.  There are multiple small smooth nodules in both breasts, most

numerous laterally.  Multiple similar nodules such as this tend to be benign,

most likely cysts.  No new or enlarging breast densities are seen.  Benign

type calcifications are evident.  No suspicious microcalcifications have

developed.





IMPRESSION: Stable left breast nodules.  Follow-up bilateral mammography in 6

months and then at yearly intervals is suggested.





BI-RADS CATEGORY: 3 PROBABLY BENIGN FINDING(S)-SHORT INTERVAL FOLLOW-UP

SUGGESTED



RECOMMENDED FOLLOW-UP: 6M 6 MONTH FOLLOW-UP



PQRS compliance statement: Patient information was entered into a reminder

system with a target due date     for the next mammogram.



Mammography is a sensitive method for finding small breast cancers, but it

does not detect them all and is not a substitute for careful clinical

examination.  A negative mammogram does not negate a clinically suspicious

finding and should not result in delay in biopsying a clinically suspicious

abnormality.



"Our facility is accredited by the American College of [employer] Mammography

Program."

## 2018-03-29 ENCOUNTER — HOSPITAL ENCOUNTER (OUTPATIENT)
Dept: HOSPITAL 63 - US | Age: 78
Discharge: HOME | End: 2018-03-29
Attending: SPECIALIST
Payer: MEDICARE

## 2018-03-29 DIAGNOSIS — R22.41: Primary | ICD-10-CM

## 2018-03-29 PROCEDURE — 76882 US LMTD JT/FCL EVL NVASC XTR: CPT

## 2018-03-29 NOTE — RAD
Indication: Right hip/buttock lump



Technique: Grayscale ultrasound images of the palpable region in the right hip

and left hip for comparison.



Comparison: None



Findings:

The interrogated region of palpable abnormality demonstrates no solid or

cystic lesion.  No skin thickening.  No edema.



Impression: No sonographic abnormality seen in the region of palpable

abnormality.

## 2018-04-04 ENCOUNTER — HOSPITAL ENCOUNTER (OUTPATIENT)
Dept: HOSPITAL 63 - LAB | Age: 78
Discharge: HOME | End: 2018-04-04
Attending: INTERNAL MEDICINE
Payer: MEDICARE

## 2018-04-04 DIAGNOSIS — I10: Primary | ICD-10-CM

## 2018-04-04 DIAGNOSIS — J44.9: ICD-10-CM

## 2018-04-04 DIAGNOSIS — Z79.01: ICD-10-CM

## 2018-04-04 DIAGNOSIS — I48.0: ICD-10-CM

## 2018-04-04 LAB
ANION GAP SERPL CALC-SCNC: 10 MMOL/L (ref 6–14)
CA-I SERPL ISE-MCNC: 25 MG/DL (ref 7–20)
CALCIUM SERPL-MCNC: 9.7 MG/DL (ref 8.5–10.1)
CHLORIDE SERPL-SCNC: 103 MMOL/L (ref 98–107)
CO2 SERPL-SCNC: 27 MMOL/L (ref 21–32)
CREAT SERPL-MCNC: 1 MG/DL (ref 0.6–1)
GFR SERPLBLD BASED ON 1.73 SQ M-ARVRAT: 54.1 ML/MIN
GLUCOSE SERPL-MCNC: 104 MG/DL (ref 70–99)
POTASSIUM SERPL-SCNC: 4.1 MMOL/L (ref 3.5–5.1)
SODIUM SERPL-SCNC: 140 MMOL/L (ref 136–145)

## 2018-04-04 PROCEDURE — 36415 COLL VENOUS BLD VENIPUNCTURE: CPT

## 2018-04-04 PROCEDURE — 80048 BASIC METABOLIC PNL TOTAL CA: CPT

## 2018-09-14 ENCOUNTER — HOSPITAL ENCOUNTER (OUTPATIENT)
Dept: HOSPITAL 63 - MAMMO | Age: 78
Discharge: HOME | End: 2018-09-14
Attending: SPECIALIST
Payer: MEDICARE

## 2018-09-14 DIAGNOSIS — I10: ICD-10-CM

## 2018-09-14 DIAGNOSIS — Z80.8: ICD-10-CM

## 2018-09-14 DIAGNOSIS — Z91.09: ICD-10-CM

## 2018-09-14 DIAGNOSIS — Z90.5: ICD-10-CM

## 2018-09-14 DIAGNOSIS — Z91.040: ICD-10-CM

## 2018-09-14 DIAGNOSIS — Z86.010: ICD-10-CM

## 2018-09-14 DIAGNOSIS — Z88.8: ICD-10-CM

## 2018-09-14 DIAGNOSIS — I48.0: ICD-10-CM

## 2018-09-14 DIAGNOSIS — Z88.0: ICD-10-CM

## 2018-09-14 DIAGNOSIS — Z88.6: ICD-10-CM

## 2018-09-14 DIAGNOSIS — Z88.2: ICD-10-CM

## 2018-09-14 DIAGNOSIS — Z83.3: ICD-10-CM

## 2018-09-14 DIAGNOSIS — J44.9: ICD-10-CM

## 2018-09-14 DIAGNOSIS — Z88.1: ICD-10-CM

## 2018-09-14 DIAGNOSIS — R92.8: Primary | ICD-10-CM

## 2018-09-14 DIAGNOSIS — Z90.49: ICD-10-CM

## 2018-09-14 DIAGNOSIS — Z82.3: ICD-10-CM

## 2018-09-14 DIAGNOSIS — Z82.5: ICD-10-CM

## 2018-09-14 DIAGNOSIS — Z90.722: ICD-10-CM

## 2018-09-14 DIAGNOSIS — Z85.828: ICD-10-CM

## 2018-09-14 DIAGNOSIS — Z87.891: ICD-10-CM

## 2018-09-14 PROCEDURE — 77066 DX MAMMO INCL CAD BI: CPT

## 2018-09-14 NOTE — RAD
DATE: 9/14/2018



EXAM: DIGITAL DIAGNOSTIC BILATERAL



HISTORY: 6 month follow-up



COMPARISON: 3/23/2018, 10/9/2017, 12/5/2016



This study was interpreted with the benefit of Computerized Aided Detection

(CAD).





Breast Density: SCATTERED The breast parenchyma shows scattered fibroglandular

densities. Breast parenchyma level B.





FINDINGS: The fibroglandular pattern is multinodular in character.  No new or

enlarging breast densities are seen.  Benign type calcifications are present. 

No suspicious microcalcifications have developed.  





IMPRESSION: Stable mammograms without evidence of malignancy.  Routine yearly

3-D mammography mammographic surveillance is suggested.





BI-RADS CATEGORY: 2 BENIGN FINDING(S)



RECOMMENDED FOLLOW-UP: 12M 12 MONTH FOLLOW-UP



PQRS compliance statement: Patient information was entered into a reminder

system with a target due date     for the next mammogram.



Mammography is a sensitive method for finding small breast cancers, but it

does not detect them all and is not a substitute for careful clinical

examination.  A negative mammogram does not negate a clinically suspicious

finding and should not result in delay in biopsying a clinically suspicious

abnormality.



"Our facility is accredited by the American College of [employer] Mammography

Program."

## 2020-03-03 ENCOUNTER — HOSPITAL ENCOUNTER (OUTPATIENT)
Dept: HOSPITAL 61 - SLPLAB | Age: 80
End: 2020-03-03
Attending: INTERNAL MEDICINE
Payer: MEDICARE

## 2020-03-03 DIAGNOSIS — G47.34: ICD-10-CM

## 2020-03-03 DIAGNOSIS — G47.33: Primary | ICD-10-CM

## 2020-03-03 PROCEDURE — 95810 POLYSOM 6/> YRS 4/> PARAM: CPT

## 2020-03-04 NOTE — SLEEP
DATE OF STUDY:  03/03/2020



SLEEP STUDY



REFERRING PHYSICIAN:  Charlotte Storm MD.



The patient is a 79-year-old who weighs 175 pounds with a BMI of 30.  The

patient's [place] score was 2.  The patient had a prior history of sleep apnea

at another facility and her machine stopped working.



During the night study, the patient spent 446 minutes in bed and slept for 316

minutes with a sleep efficiency of 71%.  Sleep latency was 153 minutes with a

REM latency of 310 minutes.  Overall, sleep architecture showed increased stage

1 and stage 2 sleep, absent slow wave and significantly reduced REM sleep.



During the night study, the patient had 13 obstructive apneas, no mixed or

central apneas and 37 hypopneas.  The patient's AHI was 10 per hour, supine AHI

9 per hour.  Very minimal REM sleep was observed.



EKG monitoring revealed normal sinus rhythm, average heart rate 80 beats per

minute, no arrhythmias observed.



Nocturnal oximetry study revealed an average oxygen saturation of 92%; the

lowest of 83%.  A 91% of time oxygen saturation remained between 80% and 89%

suggesting hypoventilation.



No PLMS seen.



Due to low AHI, the patient did not meet the split night criteria for CPAP

initiation.



IMPRESSION:

1.  Mild sleep apnea-hypopnea syndrome at an AHI of 10 per hour.  Only 1 minute

of REM sleep was observed which could have underestimated the severity of sleep

apnea.

2.  Sustained pattern of nocturnal hypoxia suggesting hypoventilation.

3.  No clinically significant periodic limb movements.



RECOMMENDATIONS:

1.  If the patient is clinically symptomatic or has comorbid conditions, then

consider treatment of sleep apnea with CPAP.

2.  Weight loss is strongly advised.

3.  Avoid CNS depressants.

4.  Cautioned regarding driving until symptoms of sleep apnea resolve with above

recommendation.

5.  If the patient does not undergo CPAP, then the patient would be eligible for

oxygen 2 liters at night.

 



______________________________

MICHELLE JIMENEZ MD DR:  RADHA/vijaya  JOB#:  [id number] / [id number]

DD:  03/04/2020 11:56  DT:  03/04/2020 12:36



SAMANTHA Guo MD, SABATO MD

## 2020-10-30 ENCOUNTER — HOSPITAL ENCOUNTER (OUTPATIENT)
Dept: HOSPITAL 63 - LAB | Age: 80
End: 2020-10-30
Attending: NURSE ANESTHETIST, CERTIFIED REGISTERED
Payer: MEDICARE

## 2020-10-30 DIAGNOSIS — Z01.812: Primary | ICD-10-CM

## 2020-10-30 DIAGNOSIS — Z20.828: ICD-10-CM

## 2020-10-30 PROCEDURE — U0003 INFECTIOUS AGENT DETECTION BY NUCLEIC ACID (DNA OR RNA); SEVERE ACUTE RESPIRATORY SYNDROME CORONAVIRUS 2 (SARS-COV-2) (CORONAVIRUS DISEASE [COVID-19]), AMPLIFIED PROBE TECHNIQUE, MAKING USE OF HIGH THROUGHPUT TECHNOLOGIES AS DESCRIBED BY CMS-2020-01-R: HCPCS

## 2020-11-03 ENCOUNTER — HOSPITAL ENCOUNTER (OUTPATIENT)
Dept: HOSPITAL 63 - SURG | Age: 80
Discharge: HOME | End: 2020-11-03
Attending: INTERNAL MEDICINE
Payer: MEDICARE

## 2020-11-03 VITALS
SYSTOLIC BLOOD PRESSURE: 127 MMHG | DIASTOLIC BLOOD PRESSURE: 78 MMHG | DIASTOLIC BLOOD PRESSURE: 78 MMHG | DIASTOLIC BLOOD PRESSURE: 78 MMHG | DIASTOLIC BLOOD PRESSURE: 78 MMHG | SYSTOLIC BLOOD PRESSURE: 127 MMHG | SYSTOLIC BLOOD PRESSURE: 127 MMHG | SYSTOLIC BLOOD PRESSURE: 127 MMHG | DIASTOLIC BLOOD PRESSURE: 78 MMHG | DIASTOLIC BLOOD PRESSURE: 78 MMHG | SYSTOLIC BLOOD PRESSURE: 127 MMHG | DIASTOLIC BLOOD PRESSURE: 78 MMHG | DIASTOLIC BLOOD PRESSURE: 78 MMHG | SYSTOLIC BLOOD PRESSURE: 127 MMHG | SYSTOLIC BLOOD PRESSURE: 127 MMHG | DIASTOLIC BLOOD PRESSURE: 78 MMHG | DIASTOLIC BLOOD PRESSURE: 78 MMHG | SYSTOLIC BLOOD PRESSURE: 127 MMHG | SYSTOLIC BLOOD PRESSURE: 127 MMHG | DIASTOLIC BLOOD PRESSURE: 78 MMHG | SYSTOLIC BLOOD PRESSURE: 127 MMHG | SYSTOLIC BLOOD PRESSURE: 127 MMHG

## 2020-11-03 DIAGNOSIS — Z80.1: ICD-10-CM

## 2020-11-03 DIAGNOSIS — Z80.0: ICD-10-CM

## 2020-11-03 DIAGNOSIS — Z86.010: ICD-10-CM

## 2020-11-03 DIAGNOSIS — Z91.040: ICD-10-CM

## 2020-11-03 DIAGNOSIS — Z87.891: ICD-10-CM

## 2020-11-03 DIAGNOSIS — Z98.890: ICD-10-CM

## 2020-11-03 DIAGNOSIS — Z88.0: ICD-10-CM

## 2020-11-03 DIAGNOSIS — Z88.6: ICD-10-CM

## 2020-11-03 DIAGNOSIS — K29.50: ICD-10-CM

## 2020-11-03 DIAGNOSIS — K31.7: ICD-10-CM

## 2020-11-03 DIAGNOSIS — R13.10: Primary | ICD-10-CM

## 2020-11-03 DIAGNOSIS — Z82.5: ICD-10-CM

## 2020-11-03 DIAGNOSIS — Z90.49: ICD-10-CM

## 2020-11-03 DIAGNOSIS — Z98.41: ICD-10-CM

## 2020-11-03 DIAGNOSIS — Z82.3: ICD-10-CM

## 2020-11-03 DIAGNOSIS — Z93.6: ICD-10-CM

## 2020-11-03 DIAGNOSIS — Z85.828: ICD-10-CM

## 2020-11-03 DIAGNOSIS — K31.89: ICD-10-CM

## 2020-11-03 DIAGNOSIS — D50.0: ICD-10-CM

## 2020-11-03 DIAGNOSIS — I48.20: ICD-10-CM

## 2020-11-03 DIAGNOSIS — K22.2: ICD-10-CM

## 2020-11-03 DIAGNOSIS — Z85.528: ICD-10-CM

## 2020-11-03 DIAGNOSIS — Z88.2: ICD-10-CM

## 2020-11-03 DIAGNOSIS — Z90.5: ICD-10-CM

## 2020-11-03 DIAGNOSIS — Z79.01: ICD-10-CM

## 2020-11-03 DIAGNOSIS — Z90.722: ICD-10-CM

## 2020-11-03 DIAGNOSIS — I10: ICD-10-CM

## 2020-11-03 DIAGNOSIS — Z83.3: ICD-10-CM

## 2020-11-03 DIAGNOSIS — Z98.42: ICD-10-CM

## 2020-11-03 DIAGNOSIS — Z88.8: ICD-10-CM

## 2020-11-03 PROCEDURE — 43450 DILATE ESOPHAGUS 1/MULT PASS: CPT

## 2020-11-03 PROCEDURE — 43239 EGD BIOPSY SINGLE/MULTIPLE: CPT

## 2020-11-05 NOTE — PATHOLOGY
[facility] Accession Number: [id number]

.                                                                01

Material submitted:                                        .

stomach - GASTRIC BIOPSY

.                                                                02

**********************************************************************

Diagnosis:

"Gastric BX", biopsy:

- Gastric mucosa with mild reactive changes and mild chronic inflammation.

- Negative H. pylori immunohistochemical stain (block A1); control reacted

appropriately.

.

(CLW:mml; 11/05/2020)

QLM  11/05/2020  1013 Local

**********************************************************************

.                                                                02

Electronically signed:                                     .

Claudette Khan MD, Pathologist

NPI- [id number]

.                                                                01

Gross description:                                         .

The specimen is received in formalin, labeled "Michelle Callahan, gastric BX"

and consists of a fragment of tan tissue measuring 0.3 x 0.3 cm which is

entirely submitted in A1.

(SDY; 11/4/2020)

SYU/SYU  11/05/2020  1007 Local

.                                                                02

Pathologist provided ICD-10:

K29.50

.                                                                02

CPT                                                        .

374094, [id number]

Specimen Comment: A courtesy copy of this report has been sent to [phone]

Specimen Comment: [id number]

Specimen Comment: Report sent to DR DUMONT

***Performed at:  01

   [facility]

   [address], [place]  [address]

   MD Papito Rodriguez MD Phone:  [phone]

***Performed at:  02

   [facility]

   [address], [place]  [address]

   MD Iftikhar Welch MD Phone:  [phone]

## 2021-03-08 ENCOUNTER — HOSPITAL ENCOUNTER (OUTPATIENT)
Dept: HOSPITAL 63 - RAD | Age: 81
End: 2021-03-08
Attending: SPECIALIST
Payer: MEDICARE

## 2021-03-08 DIAGNOSIS — M47.816: Primary | ICD-10-CM

## 2021-03-08 DIAGNOSIS — M41.87: ICD-10-CM

## 2021-03-08 PROCEDURE — 72110 X-RAY EXAM L-2 SPINE 4/>VWS: CPT

## 2021-03-08 NOTE — RAD
EXAM: Lumbar spine, 6 views.



HISTORY: Left leg numbness.



COMPARISON: None.



FINDINGS: Frontal, lateral, bilateral oblique and coned sacral views lumbar spine are obtained. There
 is minimal thoracolumbar scoliosis. There is grade 1 anterolisthesis of L4 and L5, measuring 6 mm. T
here is grade 1 anterolisthesis of L5 on S1, measuring 4 mm. There is degenerative endplate remodelin
g and Schmorl's node formation at multiple levels. There is disc space narrowing and facet arthropath
y predominantly at L5-S1. There are surgical clips within the abdomen. There is a moderate amount of 
stool throughout the colon.



IMPRESSION: 

1. Multilevel degenerative change, primarily at the lower lumbar levels.

2. Grade 1 anterolisthesis of L4 and L5 and L5-S1 and mild thoracolumbar scoliosis.



Electronically signed by: Laura Kemp MD (3/8/2021 12:02 PM) [id number]

## 2021-04-02 ENCOUNTER — HOSPITAL ENCOUNTER (OUTPATIENT)
Dept: HOSPITAL 63 - CT | Age: 81
End: 2021-04-02
Payer: MEDICARE

## 2021-04-02 DIAGNOSIS — R05: ICD-10-CM

## 2021-04-02 DIAGNOSIS — I25.10: ICD-10-CM

## 2021-04-02 DIAGNOSIS — I51.7: ICD-10-CM

## 2021-04-02 DIAGNOSIS — J47.9: Primary | ICD-10-CM

## 2021-04-02 PROCEDURE — 71250 CT THORAX DX C-: CPT

## 2021-04-02 NOTE — RAD
Examination: CT chest without contrast



HISTORY: History of cough



COMPARISON: 9/22/2014



TECHNIQUE: Axial CT images of chest were performed without contrast. Coronal and sagittal reformats a
re performed.



 Exposure: One or more of the following individualized dose reduction techniques were utilized for th
is examination:  1. Automated exposure control  2. Adjustment of the mA and/or kV according to patien
t size  3. Use of iterative reconstruction technique



FINDINGS: 



The visualized thyroid gland grossly appears unremarkable. The central airways are patent. Coronary a
rtery calcifications. Mild cardiomegaly. Small mediastinal lymph nodes identified in the pretracheal 
region with the largest measuring 1.2 cm. Moderate bilateral lung emphysematous changes. Interval inc
rease in patchy airspace opacities identified in the right upper lobe, right middle lobe, bibasilar l
ungs and in the left upper lobe of the lung increased since prior exam with multiple tree-in-bud airs
pace opacities . Mild bibasilar lung bronchiectatic changes. The visualized noncontrasted liver, sple
en, adrenals grossly appears unremarkable.



Right breast nodules similar to prior exam.



Moderate degenerative changes thoracic spine.



IMPRESSION:



1. Interval increase in patchy airspace opacities identified in the bilateral lungs with multiple tori
e-in-bud airspace opacities likely infiltrates or infectious etiology. Follow-up to resolution.









Electronically signed by: Socrates Lucia MD (4/2/2021 5:16 PM) [id number]

## 2021-06-28 ENCOUNTER — HOSPITAL ENCOUNTER (OUTPATIENT)
Dept: HOSPITAL 63 - DXRAD | Age: 81
End: 2021-06-28
Attending: SPECIALIST
Payer: MEDICARE

## 2021-06-28 DIAGNOSIS — J44.9: ICD-10-CM

## 2021-06-28 DIAGNOSIS — R05: Primary | ICD-10-CM

## 2021-06-28 DIAGNOSIS — Z87.891: ICD-10-CM

## 2021-06-28 PROCEDURE — 71046 X-RAY EXAM CHEST 2 VIEWS: CPT

## 2021-06-28 NOTE — RAD
EXAM: Chest, 2 views.



HISTORY: Cough. Asthma.



COMPARISON: 4/14/2016



FINDINGS: 2 views the chest are obtained. There are trace bilateral pleural effusions. There is bilat
eral lower lobe atelectasis or interstitial infiltrate. There is linear atelectasis or scarring along
 the right minor fissure. The heart is stable in size. There is no pneumothorax.



IMPRESSION: 

1. Bilateral lower lobe atelectasis or interstitial infiltrate with suspected trace pleural effusions
.

2. Linear atelectasis or scarring within the right mid thorax.



Electronically signed by: Laura Kemp MD (6/28/2021 2:48 PM) [id number]

## 2021-08-10 ENCOUNTER — HOSPITAL ENCOUNTER (OUTPATIENT)
Dept: HOSPITAL 63 - DXRAD | Age: 81
End: 2021-08-10
Attending: SPECIALIST
Payer: MEDICARE

## 2021-08-10 DIAGNOSIS — Z78.0: Primary | ICD-10-CM

## 2021-08-10 PROCEDURE — 77080 DXA BONE DENSITY AXIAL: CPT

## 2021-08-10 NOTE — RAD
EXAM: DUAL ENERGY X-RAY ABSORPTIOMETRY (DEXA).



HISTORY: Postmenopausal screening.



FINDINGS: The lowest measured T-score is -1.0 in the right femoral neck, based on a bone mineral dens
ity of 0.819 g/cm^2. Refer to the worksheets for full detail.



In comparison with the baseline study of 02/08/2005, average bone mineral density at the lumbar spine
 has changed -7.8%, while the average density at the hips has changed -8.3%.



IMPRESSION:

Normal. Bone mineral density yields a T-score of -1.0 or greater. Fracture risk is low.



FRAX was not calculated.



METHODOLOGY: Dual energy x-ray absorptiometry was performed to measure bone mineral density. The foll
owing analysis is based on the 2019 Official Positions of the International Society for Clinical Dens
itometry: 



Measurements of the hips and the average of L1-L4 are preferred. When the spine and/or hip cannot be 
feasibly measured or interpreted, or in the setting of hyperparathyroidism, distal radial bone minera
l density may be measured. 



The lumbar spine T-score is based on the average bone mineral density of L1-L4. In the setting of art
ifact or anatomic abnormality, some lumbar levels may be excluded, and the remaining levels used for 
calculation. A single lumbar level is not used for diagnosis, and if only a single level is available
 for assessment, another anatomic site will be used to assign a diagnosis.



The hip T-score is based on the bone mineral density measurement of the femoral neck or total proxima
l femur of either side, whichever is lowest. Bilateral mean values are not used for diagnosis.



The forearm T-score is derived from 33% of the distal radius of the nondominant forearm.



For postmenopausal and perimenopausal women, and men age 50 or older, of all ethnic groups, T-scores 
are calculated through comparison of the current measurement with the NHANES III database standard fo
r  females aged 20-29 years. The lowest T-score of the evaluated anatomic sites is used to a
ssign a diagnosis based on the World Health Organization densitometric classification. 



In premenopausal females and males younger than age 50, a Z-score is calculated based on population s
pecific reference data for patient sex and self-reported ethnicity.



Electronically signed by: AR Motley MD (8/10/2021 2:49 PM) [id number]

## 2021-09-07 ENCOUNTER — HOSPITAL ENCOUNTER (OUTPATIENT)
Dept: HOSPITAL 63 - RAD | Age: 81
End: 2021-09-07
Attending: SPECIALIST
Payer: MEDICARE

## 2021-09-07 DIAGNOSIS — M51.34: ICD-10-CM

## 2021-09-07 DIAGNOSIS — R05: Primary | ICD-10-CM

## 2021-09-07 PROCEDURE — 71046 X-RAY EXAM CHEST 2 VIEWS: CPT

## 2021-09-08 NOTE — RAD
EXAM:  XR CHEST 2V 9/7/2021 1:10 PM



CLINICAL INDICATION:  Productive cough



COMPARISON:  Chest radiograph 6/20/2021



TECHNIQUE:  PA and lateral views of the chest



FINDINGS:  The heart is normal in size. The lungs are well expanded. Streaky opacities in the lung ba
ses are unchanged. No pleural effusion or pneumothorax. Mild thoracic degenerative disc disease.



IMPRESSION:  Unchanged bibasilar streaky opacities, which may reflect infection, aspiration, and/or a
telectasis.



Electronically signed by: Raine Dunn MD (9/8/2021 11:08 AM) [id number]

## 2021-11-12 ENCOUNTER — HOSPITAL ENCOUNTER (OUTPATIENT)
Dept: HOSPITAL 63 - CT | Age: 81
End: 2021-11-12
Payer: MEDICARE

## 2021-11-12 DIAGNOSIS — J43.9: ICD-10-CM

## 2021-11-12 DIAGNOSIS — J47.9: Primary | ICD-10-CM

## 2021-11-12 DIAGNOSIS — R91.8: ICD-10-CM

## 2021-11-12 DIAGNOSIS — I25.10: ICD-10-CM

## 2021-11-12 PROCEDURE — 71250 CT THORAX DX C-: CPT

## 2021-11-12 NOTE — RAD
EXAM: CT Chest without IV contrast



CLINICAL HISTORY: Reason:  / Spl. Instructions:  / History: 



COMPARISON: 4/2/2021



TECHNIQUE: CT of the chest without intravenous contrast. Axial, coronal and sagittal reformatted imag
es were generated.



---PQRS compliance statement - One or more of the following individualized dose reduction techniques 
were utilized for this study:

1.  Automated exposure control

2.  Adjustment of the mA and/or kV according to patient size

3.  Use of iterative reconstruction technique---



FINDINGS: 

Lack of intravenous contrast limits evaluation of solid organs, vasculature, and lymph nodes. 



Chest: Heart is not enlarged. Coronary calcifications are seen. No pericardial effusion. No axillary 
lymphadenopathy. Prominent mediastinal and hilar lymph nodes are likely reactive. No pleural effusion
 or pneumothorax.



Airspace opacities lower lobes, greater than left lower lobe, progressed compared to 4/2/2021. Right 
upper lobe airspace opacities, progressed. Bronchiectasis. Extensive emphysematous changes are seen.



9 x 5 mm right lower lobe lung nodule seen.



Visualized Upper abdomen: Unremarkable



Bones: Multilevel degenerative changes of the thoracic spine are seen.



IMPRESSION:

1.  Lower lobe airspace opacities, greater in the left lower lobe, progressed compared to 4/2/21. In 
addition the right upper lobe lung nodules have also progressed. These are nonspecific and may be rel
ated to progressing infectious or inflammatory process,

2.  Emphysematous changes are seen with bronchiectasis.

3.  9 x 5 mm right lower lobe lung nodule is new compared to 4/2/2021. Follow-up CT in 6 months is re
commended.



Electronically signed by: Benito Rothman MD (11/12/2021 12:36 PM) [facility]

## 2021-11-23 ENCOUNTER — HOSPITAL ENCOUNTER (OUTPATIENT)
Dept: HOSPITAL 61 - SURG | Age: 81
Discharge: HOME | End: 2021-11-23
Attending: INTERNAL MEDICINE
Payer: MEDICARE

## 2021-11-23 VITALS — DIASTOLIC BLOOD PRESSURE: 68 MMHG | SYSTOLIC BLOOD PRESSURE: 155 MMHG

## 2021-11-23 VITALS — HEIGHT: 64 IN | BODY MASS INDEX: 29.92 KG/M2 | WEIGHT: 175.27 LBS

## 2021-11-23 VITALS — DIASTOLIC BLOOD PRESSURE: 79 MMHG | SYSTOLIC BLOOD PRESSURE: 162 MMHG

## 2021-11-23 DIAGNOSIS — Z88.2: ICD-10-CM

## 2021-11-23 DIAGNOSIS — Z88.1: ICD-10-CM

## 2021-11-23 DIAGNOSIS — Z98.890: ICD-10-CM

## 2021-11-23 DIAGNOSIS — Z90.710: ICD-10-CM

## 2021-11-23 DIAGNOSIS — Z91.040: ICD-10-CM

## 2021-11-23 DIAGNOSIS — R05.3: ICD-10-CM

## 2021-11-23 DIAGNOSIS — M19.90: ICD-10-CM

## 2021-11-23 DIAGNOSIS — Z87.891: ICD-10-CM

## 2021-11-23 DIAGNOSIS — I10: ICD-10-CM

## 2021-11-23 DIAGNOSIS — K21.9: ICD-10-CM

## 2021-11-23 DIAGNOSIS — J43.9: ICD-10-CM

## 2021-11-23 DIAGNOSIS — J18.8: Primary | ICD-10-CM

## 2021-11-23 DIAGNOSIS — Z90.49: ICD-10-CM

## 2021-11-23 LAB
BASOPHILS # BLD AUTO: 0.1 X10^3/UL (ref 0–0.2)
BASOPHILS NFR BLD: 1 % (ref 0–3)
EOSINOPHIL NFR BLD: 0.2 X10^3/UL (ref 0–0.7)
EOSINOPHIL NFR BLD: 1 % (ref 0–3)
ERYTHROCYTE [DISTWIDTH] IN BLOOD BY AUTOMATED COUNT: 17.5 % (ref 11.5–14.5)
HCT VFR BLD CALC: 35.6 % (ref 36–47)
HGB BLD-MCNC: 11.5 G/DL (ref 12–15.5)
LYMPHOCYTES # BLD: 2.3 X10^3/UL (ref 1–4.8)
LYMPHOCYTES NFR BLD AUTO: 16 % (ref 24–48)
MCH RBC QN AUTO: 26 PG (ref 25–35)
MCHC RBC AUTO-ENTMCNC: 32 G/DL (ref 31–37)
MCV RBC AUTO: 81 FL (ref 79–100)
MONO #: 1.4 X10^3/UL (ref 0–1.1)
MONOCYTES NFR BLD: 9 % (ref 0–9)
NEUT #: 10.7 X10^3/UL (ref 1.8–7.7)
NEUTROPHILS NFR BLD AUTO: 73 % (ref 31–73)
PLATELET # BLD AUTO: 369 X10^3/UL (ref 140–400)
PROTHROMBIN TIME: 12.6 SEC (ref 11.7–14)
RBC # BLD AUTO: 4.4 X10^6/UL (ref 3.5–5.4)
WBC # BLD AUTO: 14.6 X10^3/UL (ref 4–11)

## 2021-11-23 PROCEDURE — 87205 SMEAR GRAM STAIN: CPT

## 2021-11-23 PROCEDURE — 85610 PROTHROMBIN TIME: CPT

## 2021-11-23 PROCEDURE — 31622 DX BRONCHOSCOPE/WASH: CPT

## 2021-11-23 PROCEDURE — 31624 DX BRONCHOSCOPE/LAVAGE: CPT

## 2021-11-23 PROCEDURE — 87070 CULTURE OTHR SPECIMN AEROBIC: CPT

## 2021-11-23 PROCEDURE — 87102 FUNGUS ISOLATION CULTURE: CPT

## 2021-11-23 PROCEDURE — 87077 CULTURE AEROBIC IDENTIFY: CPT

## 2021-11-23 PROCEDURE — 36415 COLL VENOUS BLD VENIPUNCTURE: CPT

## 2021-11-23 PROCEDURE — 87116 MYCOBACTERIA CULTURE: CPT

## 2021-11-23 PROCEDURE — 94640 AIRWAY INHALATION TREATMENT: CPT

## 2021-11-23 PROCEDURE — 88112 CYTOPATH CELL ENHANCE TECH: CPT

## 2021-11-23 PROCEDURE — 85025 COMPLETE CBC W/AUTO DIFF WBC: CPT

## 2021-11-23 PROCEDURE — 87186 SC STD MICRODIL/AGAR DIL: CPT

## 2021-11-23 PROCEDURE — 88312 SPECIAL STAINS GROUP 1: CPT

## 2021-11-23 NOTE — OP
DATE OF SURGERY: 11/23/2021

INDICATIONS:  Persistent cough and bilateral pneumonia.



Informed consent was obtained from the patient.  She agreed to proceed.  All 

risks and benefits were explained.  Sedation with propofol was used by 

anesthesia.



Her both the nostrils were tight as a result bronchoscope was passed through the

oral route.  The upper airway was passed and vocal cords reached.  No vocal cord

lesion seen.  The move equally with respiration.  Trachea was entered.  No 

tracheal lesions seen.  The sarah was sharp.  Left lung was first examined.  

There were copious amount of purulent secretions seen throughout the left lung, 

worse in the lingula and left lower lobe.  Bronchoalveolar lavage performed from

the lingula as well as a left lower lobe.  Bronchial washings were performed 

from the left lung as well.  No endobronchial lesion seen.  Bronch was 

introduced into the right lung.  There were copious secretions in the right 

upper lobe.  A bronchoalveolar lavage performed from this area.  No significant 

secretion seen in the right middle or right lower lobe.  No endobronchial 

lesions seen.  The patient tolerated the procedure well.



IMPRESSION:

1.  Copious amount of creamy secretions seen throughout both lungs, worse in the

left lingula, left lower lobe and right upper lobe.

2.  No endobronchial lesion seen.

3.  Bronchoalveolar lavage performed from the right upper lobe, lingula and left

lower lobe and sent for appropriate studies including AFB and PCP.

4.  The patient to follow up with Dr. Storm to discuss the results of the 

cultures.







YONI SANTOYO: Jaron   DD: 11/23/2021 12:06

DT: 11/23/2021 12:29   TID: [id number]

## 2021-11-29 NOTE — PATHOLOGY
Note

[facility] Accession Number: [id number]

   TESTS               RESULT  FLAG  UNITS    REF RANGE  LAB

------------------------------------------------------------

   Clinician Provided Cytology Information

   No. of containers..01 Other (Miscellaneous)

Source:                                                   01

   LEFT LUNG BR WASH

DIAGNOSIS:                                                02

   LEFT LUNG BR WASH

   NEGATIVE FOR MALIGNANT CELLS.

   FEW SQUAMOUS EPITHELIAL CELLS AND PULMONARY MACROPHAGES PRESENT WITHIN

   A BACKGROUND OF OBSCURING NEUTROPHILS. NO PRESERVED BRONCHIAL EPITHELIAL

   CELLS PRESENT

   PROPERLY CONTROLLED GMS STAIN IS NEGATIVE FOR PNEUMOCYSTIS AND

   YEAST/FUNGI.

Signed out by:                                            02

   Iftikhar Welch MD, Pathologist

   NPI- [id number]

Performed by:                                             01

   Cesar Fernandez II, Cytotechnologist ([facility])

Gross description:                                        01

   33 ML, WHITE, CLOUDY

   /LCS  11/25/2021  0059 Local



------------------------------------------------------------

    FLAG LEGEND:

    L-Low Normal,H-High Normal,LL-Alert Low,HH-Alert High

    <-Panic Low,>-Panic High,A-Abnormal,AA-Critical Abnormal

------------------------------------------------------------



Performed at:

01 [facility] [address]

   [place]  [address]

   Papito Rodriguez MD, Phone: [phone]

02 [facility]

   [address] [place]  [address]

   Iftikhar Welch MD, Phone: [phone]

***Performed at:  01

   [address], [place], [place]  [address]

   MD Papito Rodriguez MD Phone:  [phone]

## 2021-11-29 NOTE — PATHOLOGY
Note

[facility] Accession Number: [id number]

   TESTS               RESULT  FLAG  UNITS    REF RANGE  LAB

------------------------------------------------------------

   Clinician Provided Cytology Information

   No. of containers..01 Other (Miscellaneous)

Source:                                                   01

   LLL BAL

DIAGNOSIS:                                                02

   LLL BAL

   NEGATIVE FOR MALIGNANT CELLS.

   BRONCHIAL EPITHELIAL CELLS, FEW SQUAMOUS EPITHELIAL CELLS, PULMONARY

   MACROPHAGES, AND NEUTROPHILS PRESENT.

   PROPERLY CONTROLLED GMS STAIN IS NEGATIVE FOR PNEUMOCYSTIS AND

   YEAST/FUNGI.

Signed out by:                                            02

   Iftikhar Welch MD, Pathologist

   NPI- [id number]

Performed by:                                             01

   Cesar Fernandez II, Cytotechnologist ([facility])

Gross description:                                        01

   38 ML, CLEAR, CLOUDY

   /LCS  11/25/2021  0103 Local



------------------------------------------------------------

    FLAG LEGEND:

    L-Low Normal,H-High Normal,LL-Alert Low,HH-Alert High

    <-Panic Low,>-Panic High,A-Abnormal,AA-Critical Abnormal

------------------------------------------------------------



Performed at:

[address] [facility]

   [place]  [address]

   Papito Rodriguez MD, Phone: [phone]

[address] [facility] [address] [place]  [address]

   Iftikhar Welch MD, Phone: [phone]

***Performed at:  01

   [address], [place], [place]  [address]

   MD Papito Rodriguez MD Phone:  [phone]

## 2021-12-17 ENCOUNTER — HOSPITAL ENCOUNTER (OUTPATIENT)
Dept: HOSPITAL 63 - LAB | Age: 81
End: 2021-12-17
Payer: MEDICARE

## 2021-12-17 DIAGNOSIS — R94.31: Primary | ICD-10-CM

## 2021-12-17 PROCEDURE — 93005 ELECTROCARDIOGRAM TRACING: CPT

## 2021-12-17 NOTE — EKG
[facility] [address], [place] [address]

Test Date:    2021               Test Time:    08:17:07

Pat Name:     NEFTALI CONRAD               Department:   

Patient ID:   [id number]           Room:          

Gender:       F                        Technician:   

:          [id number]               Requested By: UNKNOWN PCP

Order Number: [id number]            Reading MD:   Anselmo Barrios

                                 Measurements

Intervals                              Axis          

Rate:         72                       P:            56

MN:           208                      QRS:          -28

QRSD:         90                       T:            45

QT:           398                                    

QTc:          437                                    

                           Interpretive Statements

SINUS RHYTHM

LEFTWARD AXIS

Electronically Signed On 2021 15:41:41 CST by Anselmo Barrios

## 2021-12-22 ENCOUNTER — HOSPITAL ENCOUNTER (OUTPATIENT)
Dept: HOSPITAL 63 - LAB | Age: 81
End: 2021-12-22
Attending: SPECIALIST
Payer: MEDICARE

## 2021-12-22 DIAGNOSIS — Z20.822: Primary | ICD-10-CM

## 2021-12-22 PROCEDURE — 87426 SARSCOV CORONAVIRUS AG IA: CPT

## 2021-12-23 ENCOUNTER — HOSPITAL ENCOUNTER (OUTPATIENT)
Dept: HOSPITAL 61 - INTRAD | Age: 81
Discharge: HOME | End: 2021-12-23
Payer: MEDICARE

## 2021-12-23 VITALS — SYSTOLIC BLOOD PRESSURE: 139 MMHG | DIASTOLIC BLOOD PRESSURE: 65 MMHG

## 2021-12-23 DIAGNOSIS — Z90.710: ICD-10-CM

## 2021-12-23 DIAGNOSIS — Z45.2: Primary | ICD-10-CM

## 2021-12-23 DIAGNOSIS — Z90.49: ICD-10-CM

## 2021-12-23 DIAGNOSIS — M19.90: ICD-10-CM

## 2021-12-23 DIAGNOSIS — Z88.8: ICD-10-CM

## 2021-12-23 DIAGNOSIS — I10: ICD-10-CM

## 2021-12-23 DIAGNOSIS — Z88.0: ICD-10-CM

## 2021-12-23 DIAGNOSIS — J43.9: ICD-10-CM

## 2021-12-23 DIAGNOSIS — Z87.891: ICD-10-CM

## 2021-12-23 DIAGNOSIS — Z88.1: ICD-10-CM

## 2021-12-23 DIAGNOSIS — Z79.899: ICD-10-CM

## 2021-12-23 DIAGNOSIS — K21.9: ICD-10-CM

## 2021-12-23 DIAGNOSIS — Z98.890: ICD-10-CM

## 2021-12-23 PROCEDURE — 77001 FLUOROGUIDE FOR VEIN DEVICE: CPT

## 2021-12-23 PROCEDURE — 36573 INSJ PICC RS&I 5 YR+: CPT

## 2021-12-23 PROCEDURE — C1751 CATH, INF, PER/CENT/MIDLINE: HCPCS

## 2021-12-23 PROCEDURE — C1892 INTRO/SHEATH,FIXED,PEEL-AWAY: HCPCS

## 2021-12-23 NOTE — RAD
Date:



Exam: Fluoroscopic and ultrasound guided peripheral central venous catheter placement.

 

Indication:  



Consent: The procedure was explained in its entirety to the patient or the patients designated repres
entative by a member of the treatment team, including a discussion of the risks, benefits and commonl
y accepted alternatives to the procedure, as well as the expected consequences of no therapy whatsoev
er.   Discussion of the risks included, but was not limited to, those that are most frequent and thos
e that are rare but possibly severe or life-threatening, as well as the possibility of unforeseen com
plications.  



Discussion: 



A timeout procedure was performed.   The patient was prepped and draped using maximum sterile techniq
ue, including the use of: Current guideline approved cutaneous antisepsis, a large sterile sheet to e
stablish a sterile field. Additionally the  wore a hat, mask, sterile gloves, a sterile gown 
during the procedure as well as practiced acceptable hand hygiene prior to placing the line. 1% lidoc
arturo was administered for local anesthesia. 



Ultrasound evaluation demonstrates a patent  right cephalic vein. Reference images were saved in the 
medical record. The selected vein was accessed using micropuncture technique. A guidewire was advance
d centrally. The PICC line was cut to length, and advanced through a  peel-away sheath such that it's
 tip resides at the cavoatrial junction. The peel-away sheath a sheath was removed. The catheter was 
secured in place. The catheter was found to flush and aspirate normally.. Sterile dressings were appl
ied. No immediate complications were identified.



Fluoroscopy time: 0.5 minutes

Dose area product: 0 point Gycm2



Impression: Successful placement of a right upper extremity PICC line

 

 



Electronically signed by: Juan Bullard MD (12/23/2021 12:50 PM) [id number]

## 2021-12-23 NOTE — RAD
Date:



Exam: Fluoroscopic and ultrasound guided peripheral central venous catheter placement.

 

Indication:  



Consent: The procedure was explained in its entirety to the patient or the patients designated repres
entative by a member of the treatment team, including a discussion of the risks, benefits and commonl
y accepted alternatives to the procedure, as well as the expected consequences of no therapy whatsoev
er.   Discussion of the risks included, but was not limited to, those that are most frequent and thos
e that are rare but possibly severe or life-threatening, as well as the possibility of unforeseen com
plications.  



Discussion: 



A timeout procedure was performed.   The patient was prepped and draped using maximum sterile techniq
ue, including the use of: Current guideline approved cutaneous antisepsis, a large sterile sheet to e
stablish a sterile field. Additionally the  wore a hat, mask, sterile gloves, a sterile gown 
during the procedure as well as practiced acceptable hand hygiene prior to placing the line. 1% lidoc
arturo was administered for local anesthesia. 



Ultrasound evaluation demonstrates a patent  right cephalic vein. Reference images were saved in the 
medical record. The selected vein was accessed using micropuncture technique. A guidewire was advance
d centrally. The PICC line was cut to length, and advanced through a  peel-away sheath such that it's
 tip resides at the cavoatrial junction. The peel-away sheath a sheath was removed. The catheter was 
secured in place. The catheter was found to flush and aspirate normally.. Sterile dressings were appl
ied. No immediate complications were identified.



Fluoroscopy time: 0.5 minutes

Dose area product: 0 point Gycm2



Impression: Successful placement of a right upper extremity PICC line

 

 



Electronically signed by: Juan Bullard MD (12/23/2021 12:50 PM) [id number]

## 2022-02-07 ENCOUNTER — HOSPITAL ENCOUNTER (OUTPATIENT)
Dept: HOSPITAL 63 - CT | Age: 82
End: 2022-02-07
Payer: MEDICARE

## 2022-02-07 DIAGNOSIS — M47.819: ICD-10-CM

## 2022-02-07 DIAGNOSIS — J98.09: Primary | ICD-10-CM

## 2022-02-07 DIAGNOSIS — Z90.49: ICD-10-CM

## 2022-02-07 DIAGNOSIS — J43.9: ICD-10-CM

## 2022-02-07 DIAGNOSIS — I25.10: ICD-10-CM

## 2022-02-07 DIAGNOSIS — Z87.09: ICD-10-CM

## 2022-02-07 DIAGNOSIS — R59.0: ICD-10-CM

## 2022-02-07 PROCEDURE — 71250 CT THORAX DX C-: CPT

## 2022-02-07 NOTE — RAD
CT chest without contrast dated 2/7/2022.



COMPARISON: 11/12/2021.



INDICATION: Follow-up pulmonary nodule



TECHNIQUE:



Contiguous axial imaging the chest performed without the administration of intravenous contrast. 

One or more of the following individualized dose reduction techniques were utilized for this examinat
ion:  

1. Automated exposure control  

2. Adjustment of the mA and/or kV according to patient size  

3. Use of iterative reconstruction technique



FINDINGS:



Heart size is within normal limits. No pericardial effusion. Coronary artery calcifications. There ar
e borderline enlarged pretracheal and right paratracheal lymph nodes measuring up to 10 mm short axis
, unchanged. There is also borderline enlarged subcarinal lymph node. No definite hilar or axillary a
denopathy. Thyroid gland unremarkable.



Central airways are patent. Mild to moderate emphysema. There is diffuse bronchial wall thickening, s
omewhat improved. Prominent reticular nodular tree-in-bud opacities along the bronchovascular bundles
 of the bilateral lower lobes have overall somewhat improved. There is also improving areas of consol
idation in the anterior left lower lobe and anterior right upper lobe. No new parenchymal nodule or m
ass. No pleural effusion.



Images of the upper abdomen are unremarkable. Gallbladder surgically absent. 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
ly absent. There is evidence of prior right colon resection.



Bone windows show no acute findings. Multilevel spondylosis.



IMPRESSION:

1. Interval improvement in extensive reticular nodular airspace disease and bronchial wall thickening
, consistent with improving bronchopneumonia. No new pulmonary nodule or mass.

2. Mild mediastinal lymphadenopathy, nonspecific but unchanged.

3. Emphysema.



Electronically signed by: Avel Brown MD (2/7/2022 10:08 AM) [facility]

## 2022-05-03 ENCOUNTER — HOSPITAL ENCOUNTER (OUTPATIENT)
Dept: HOSPITAL 63 - LAB | Age: 82
End: 2022-05-03
Payer: MEDICARE

## 2022-05-03 DIAGNOSIS — D82.4: Primary | ICD-10-CM

## 2022-05-03 PROCEDURE — 36415 COLL VENOUS BLD VENIPUNCTURE: CPT

## 2022-05-03 PROCEDURE — 82785 ASSAY OF IGE: CPT
